# Patient Record
Sex: MALE | Employment: UNEMPLOYED | URBAN - NONMETROPOLITAN AREA
[De-identification: names, ages, dates, MRNs, and addresses within clinical notes are randomized per-mention and may not be internally consistent; named-entity substitution may affect disease eponyms.]

---

## 2024-09-10 ENCOUNTER — OFFICE VISIT (OUTPATIENT)
Dept: URGENT CARE | Facility: PHYSICIAN GROUP | Age: 49
End: 2024-09-10
Payer: MEDICAID

## 2024-09-10 VITALS
DIASTOLIC BLOOD PRESSURE: 80 MMHG | TEMPERATURE: 97.3 F | WEIGHT: 315 LBS | OXYGEN SATURATION: 98 % | SYSTOLIC BLOOD PRESSURE: 128 MMHG | HEART RATE: 110 BPM | RESPIRATION RATE: 16 BRPM | BODY MASS INDEX: 44.1 KG/M2 | HEIGHT: 71 IN

## 2024-09-10 DIAGNOSIS — R00.0 TACHYCARDIA: ICD-10-CM

## 2024-09-10 DIAGNOSIS — H10.31 ACUTE BACTERIAL CONJUNCTIVITIS OF RIGHT EYE: ICD-10-CM

## 2024-09-10 PROCEDURE — 99204 OFFICE O/P NEW MOD 45 MIN: CPT | Performed by: FAMILY MEDICINE

## 2024-09-10 PROCEDURE — 3074F SYST BP LT 130 MM HG: CPT | Performed by: FAMILY MEDICINE

## 2024-09-10 PROCEDURE — 3079F DIAST BP 80-89 MM HG: CPT | Performed by: FAMILY MEDICINE

## 2024-09-10 RX ORDER — CIPROFLOXACIN HYDROCHLORIDE 3.5 MG/ML
1 SOLUTION/ DROPS TOPICAL 2 TIMES DAILY
Qty: 5 ML | Refills: 0 | Status: SHIPPED | OUTPATIENT
Start: 2024-09-10

## 2024-09-10 RX ORDER — LEVETIRACETAM 500 MG/1
500 TABLET ORAL 2 TIMES DAILY
COMMUNITY
End: 2024-09-19

## 2024-09-10 NOTE — PROGRESS NOTES
"  Subjective:      48 y.o. male presents to urgent care for right eye abnormality that started on Friday.  He reports that he left his contacts into long, for approximately 3 months without changing them.  He developed irritation, discharge, and reddish discoloration to his right eye on Friday.  He was able to take both contacts out and they were fully intact.  Unfortunately, he continues to experience his right eye abnormalities.  He last saw an eye doctor approximately 1.5 years ago.    Heart rate is elevated today in urgent care. He denies any chest pain, palpitations, or shortness of breath.    He denies any other questions or concerns at this time.    Current problem list, medication, and past medical/surgical history were reviewed in Epic.    ROS  See HPI     Objective:      /80   Pulse (!) 110   Temp 36.3 °C (97.3 °F) (Temporal)   Resp 16   Ht 1.803 m (5' 11\")   Wt (!) 149 kg (328 lb)   SpO2 98%   BMI 45.75 kg/m²     Physical Exam  Constitutional:       General: He is not in acute distress.     Appearance: He is not diaphoretic.   HENT:      Right Ear: Tympanic membrane, ear canal and external ear normal.      Left Ear: Tympanic membrane, ear canal and external ear normal.      Mouth/Throat:      Tongue: Tongue does not deviate from midline.      Palate: No lesions.      Pharynx: No oropharyngeal exudate or posterior oropharyngeal erythema.      Tonsils: No tonsillar exudate.   Eyes:      General:         Right eye: Discharge present.         Left eye: No discharge.      Extraocular Movements: Extraocular movements intact.      Conjunctiva/sclera:      Right eye: Right conjunctiva is injected.      Left eye: Left conjunctiva is not injected.      Pupils: Pupils are equal, round, and reactive to light.   Cardiovascular:      Rate and Rhythm: Regular rhythm. Tachycardia present.      Heart sounds: Normal heart sounds.   Pulmonary:      Effort: Pulmonary effort is normal. No respiratory distress.    "   Breath sounds: Normal breath sounds.   Neurological:      Mental Status: He is alert.   Psychiatric:         Mood and Affect: Affect normal.         Judgment: Judgment normal.       Assessment/Plan:     1. Acute bacterial conjunctivitis of right eye  2. Tachycardia  Systemic symptoms seen through tachycardia.  Prescription for ciprofloxacin eyedrops has been sent.  He was encouraged to follow with optometry within the next 24 to 48 hours.  - ciprofloxacin (CILOXIN) 0.3 % Solution; Administer 1 Drop into the right eye 2 times a day.  Dispense: 5 mL; Refill: 0          Instructed to return to Urgent Care or nearest Emergency Department if symptoms fail to improve, for any change in condition, further concerns, or new concerning symptoms. Patient states understanding of the plan of care and discharge instructions.    Sarah Piper M.D.

## 2024-09-18 ENCOUNTER — HOSPITAL ENCOUNTER (EMERGENCY)
Facility: MEDICAL CENTER | Age: 49
End: 2024-09-18
Payer: MEDICAID

## 2024-09-19 ENCOUNTER — HOSPITAL ENCOUNTER (EMERGENCY)
Facility: MEDICAL CENTER | Age: 49
End: 2024-09-19
Attending: EMERGENCY MEDICINE
Payer: MEDICAID

## 2024-09-19 VITALS
HEART RATE: 100 BPM | OXYGEN SATURATION: 98 % | RESPIRATION RATE: 18 BRPM | TEMPERATURE: 97.8 F | WEIGHT: 315 LBS | DIASTOLIC BLOOD PRESSURE: 100 MMHG | SYSTOLIC BLOOD PRESSURE: 167 MMHG | HEIGHT: 71 IN | BODY MASS INDEX: 44.1 KG/M2

## 2024-09-19 DIAGNOSIS — B30.0: ICD-10-CM

## 2024-09-19 DIAGNOSIS — H16.001 CORNEAL ULCER OF RIGHT EYE: ICD-10-CM

## 2024-09-19 DIAGNOSIS — R56.9 SEIZURE (HCC): ICD-10-CM

## 2024-09-19 DIAGNOSIS — H54.7 LOSS OF VISION: ICD-10-CM

## 2024-09-19 LAB
GRAM STN SPEC: NORMAL
SIGNIFICANT IND 70042: NORMAL
SITE SITE: NORMAL
SOURCE SOURCE: NORMAL

## 2024-09-19 PROCEDURE — 99284 EMERGENCY DEPT VISIT MOD MDM: CPT

## 2024-09-19 PROCEDURE — 700101 HCHG RX REV CODE 250: Mod: UD | Performed by: EMERGENCY MEDICINE

## 2024-09-19 PROCEDURE — 87070 CULTURE OTHR SPECIMN AEROBIC: CPT

## 2024-09-19 PROCEDURE — 87205 SMEAR GRAM STAIN: CPT

## 2024-09-19 RX ORDER — ERYTHROMYCIN 5 MG/G
1 OINTMENT OPHTHALMIC
Qty: 3.5 G | Refills: 0 | Status: ACTIVE | OUTPATIENT
Start: 2024-09-19 | End: 2024-09-27

## 2024-09-19 RX ORDER — MOXIFLOXACIN 5 MG/ML
1 SOLUTION/ DROPS OPHTHALMIC
Qty: 6 ML | Refills: 0 | Status: ACTIVE | OUTPATIENT
Start: 2024-09-19 | End: 2024-09-26

## 2024-09-19 RX ORDER — PROPARACAINE HYDROCHLORIDE 5 MG/ML
1 SOLUTION/ DROPS OPHTHALMIC ONCE
Status: COMPLETED | OUTPATIENT
Start: 2024-09-19 | End: 2024-09-19

## 2024-09-19 RX ORDER — LEVETIRACETAM 500 MG/1
500 TABLET ORAL 2 TIMES DAILY
Qty: 60 TABLET | Refills: 0 | Status: SHIPPED | OUTPATIENT
Start: 2024-09-19

## 2024-09-19 RX ADMIN — FLUORESCEIN SODIUM 1 MG: 1 STRIP OPHTHALMIC at 14:36

## 2024-09-19 RX ADMIN — PROPARACAINE HYDROCHLORIDE 1 DROP: 5 SOLUTION/ DROPS OPHTHALMIC at 14:36

## 2024-09-19 NOTE — ED TRIAGE NOTES
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    Pt returned to lobby. Educated on triage process and to inform staff of any changes.     BP (!) 144/91   Pulse 98   Temp 35.8 °C (96.5 °F) (Temporal)   Resp 18   Ht 1.803 m (5' 11\")   Wt (!) 149 kg (328 lb 0.7 oz)   SpO2 97%   BMI 45.75 kg/m²     "

## 2024-09-19 NOTE — ED PROVIDER NOTES
"ER Provider Note    Scribed for Ho Vincent M.d. by Mayank Gatica. 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 Hx cl abuse + peroxide. Called [facility] to see if they could fit pt in today. [facility] recommended pt to go to the ER today. Will send notes with pt to go to [place] [facility]. Discussed risk of losing vision permanently out of OD. Pt voiced understanding.\" Patient was also diagnosed with Panuveitis, right eye: \"Anterior and posterior uveitis along with K ulcer. Possible endophthalmitis. Send to ER ASAP.\"    HPI/ROS  LIMITATION TO HISTORY   None noted   OUTSIDE HISTORIAN(S):  None noted     Alexander Prasad is a 48 y.o. male who presents to the ED complaining of right eye pain. Patient arrives to ED with eye patch on his right eye. Patient denies congential blindness, which is incorrectly listed in his medical history. Patient was seen by an optometrist last night who advised him to come to the ED. Patient notes he is compliant with prescribed antibiotics, and takes ciprofloxacin drops once a day for past 5 days. Patient notes he wears a contact lens in the unaffected left eye, does not use contact lenses in his affected eye. Patient says the issue began due to an irritated contact lens. He had previously been splashing peroxide into the affected eye. Patient says he can \"barely see shapes\" out of his affected eye. Patient additionally note she is out of his keppra prescription and would like refill.    PAST MEDICAL HISTORY  History reviewed. No pertinent past " "medical history.    SURGICAL HISTORY  History reviewed. No pertinent surgical history.    FAMILY HISTORY  None noted     SOCIAL HISTORY   reports that he has been smoking cigarettes. He has never used smokeless tobacco. He reports that he does not currently use alcohol. He reports that he does not currently use drugs.    CURRENT MEDICATIONS  Discharge Medication List as of 9/19/2024  3:20 PM        CONTINUE these medications which have NOT CHANGED    Details   ciprofloxacin (CILOXIN) 0.3 % Solution Administer 1 Drop into the right eye 2 times a day., Disp-5 mL, R-0, Normal      levETIRAcetam (KEPPRA) 500 MG Tab Take 500 mg by mouth 2 times a day., Historical Med             ALLERGIES  Patient has no known allergies.    PHYSICAL EXAM  BP (!) 144/91   Pulse 98   Temp 35.8 °C (96.5 °F) (Temporal)   Resp 18   Ht 1.803 m (5' 11\")   Wt (!) 149 kg (328 lb 0.7 oz)   SpO2 97%   BMI 45.75 kg/m²   Constitutional: Alert in no apparent distress.  HENT: No signs of trauma, Bilateral external ears normal, Nose normal. Uvula midline.   Eyes: Pupils are equal and reactive, right sided conjunctival injection.  Ulcer size of pupil approximately half a cm on right cornea. On visual test, patient barely able to visualize the largest 'E' with the affected eye.   Neck: Normal range of motion, No tenderness, Supple, No stridor.   Lymphatic: No lymphadenopathy noted.   Cardiovascular: Regular rate and rhythm, no murmurs.   Thorax & Lungs: No respiratory distress, No wheezing, No chest tenderness.   Skin: Warm, Dry, No erythema, No rash.   Back: No bony tenderness, No CVA tenderness.   Extremities: Intact distal pulses, No edema, No tenderness, No cyanosis.  Neurologic: Alert , Normal motor function, Normal sensory function, No focal deficits noted.   Psychiatric: Affect normal, Judgment normal, Mood normal.      DIAGNOSTIC STUDIES    EKG/LABS    I have independently interpreted this EKG        Radiologist interpretation:  No orders " to display       COURSE & MEDICAL DECISION MAKING     ASSESSMENT, COURSE AND PLAN  Care Narrative:     2:19 PM - Patient is a 48 y.o. male who presents to the ED with right eye pain. Patient seen and examined at bedside. Discussed plan of care, including plan to further evaluate. Patient agrees to the plan of care. The patient will be medicated as ordered. Ordered for labs and imaging to evaluate his symptoms. Call out to ophthalmology. Advised patient of possibility for surgery now.     2:54 PM I discussed the patient's case and the above findings with Dr. Lovett (ophthalmology) who advises we may swab the ye and send him a clinical picture. If infection is progressed enough, we weill admit the patient pending his evaluation.    3:18 PM - Dr Lovett (ophthalmology) will see the patient as an outpatient tomorrow. I discussed plan for discharge and follow up as outlined below. The patient is stable for discharge at this time and will return for any new or worsening symptoms. Patient verbalizes understanding and support with my plan for discharge.      #eye pain  Symptoms consistent with infectious keratitis and uveitis.  EOM intact and no surrounding cellulitis.  Fluorescein exam positive for corneal ulcer.  Negative Clover sign, doubt globe rupture.  No evidence of retained foreign body.     DISPOSITION AND DISCUSSIONS  I have discussed management of the patient with the following physicians and LAMONT's:  Dr Lovett (ophthalmology)    Barriers to care at this time, including but not limited to: Patient does not have established PCP.     The patient will return for new or worsening symptoms and is stable at the time of discharge.    DISPOSITION:  Patient will be discharged home in stable condition.    FOLLOW UP:  [facility], Emergency Dept  [facility] [facility] [address] [phone]    If symptoms worsen    Aston Lovett M.D.  [address] [place] [id number] [phone]      go to now.   If you can make it before 4p, you may be seen.      OUTPATIENT MEDICATIONS:  Discharge Medication List as of 9/19/2024  3:20 PM        START taking these medications    Details   moxifloxacin (VIGAMOX) 0.5 % Solution Administer 1 Drop into the right eye every hour while awake for 7 days., Disp-6 mL, R-0, Normal              FINAL DIANGOSIS  1. Loss of vision    2. Corneal ulcer of right eye    3. Keratoconjunctivitis, infectious    4. Seizure (HCC)          The note accurately reflects work and decisions made by me.  Ho Vincent M.D.  9/19/2024  8:42 PM

## 2024-09-22 LAB
BACTERIA WND AEROBE CULT: NORMAL
GRAM STN SPEC: NORMAL
SIGNIFICANT IND 70042: NORMAL
SITE SITE: NORMAL
SOURCE SOURCE: NORMAL

## 2024-10-01 ENCOUNTER — OFFICE VISIT (OUTPATIENT)
Dept: MEDICAL GROUP | Facility: MEDICAL CENTER | Age: 49
End: 2024-10-01
Attending: FAMILY MEDICINE
Payer: MEDICAID

## 2024-10-01 VITALS
OXYGEN SATURATION: 95 % | WEIGHT: 315 LBS | HEART RATE: 98 BPM | DIASTOLIC BLOOD PRESSURE: 68 MMHG | BODY MASS INDEX: 44.1 KG/M2 | HEIGHT: 71 IN | TEMPERATURE: 98.3 F | SYSTOLIC BLOOD PRESSURE: 118 MMHG

## 2024-10-01 DIAGNOSIS — Z11.4 SCREENING FOR HIV WITHOUT PRESENCE OF RISK FACTORS: ICD-10-CM

## 2024-10-01 DIAGNOSIS — Z13.21 SCREENING FOR ENDOCRINE, NUTRITIONAL, METABOLIC AND IMMUNITY DISORDER: ICD-10-CM

## 2024-10-01 DIAGNOSIS — Z13.0 SCREENING FOR ENDOCRINE, NUTRITIONAL, METABOLIC AND IMMUNITY DISORDER: ICD-10-CM

## 2024-10-01 DIAGNOSIS — Z13.29 SCREENING FOR ENDOCRINE, NUTRITIONAL, METABOLIC AND IMMUNITY DISORDER: ICD-10-CM

## 2024-10-01 DIAGNOSIS — Z11.59 NEED FOR HEPATITIS C SCREENING TEST: ICD-10-CM

## 2024-10-01 DIAGNOSIS — H18.601 KERATOCONUS OF RIGHT EYE: ICD-10-CM

## 2024-10-01 DIAGNOSIS — F10.11 HISTORY OF ALCOHOL ABUSE: ICD-10-CM

## 2024-10-01 DIAGNOSIS — Z13.228 SCREENING FOR ENDOCRINE, NUTRITIONAL, METABOLIC AND IMMUNITY DISORDER: ICD-10-CM

## 2024-10-01 DIAGNOSIS — F15.11 HISTORY OF METHAMPHETAMINE ABUSE (HCC): ICD-10-CM

## 2024-10-01 DIAGNOSIS — Z23 NEED FOR VACCINATION: ICD-10-CM

## 2024-10-01 DIAGNOSIS — H16.001 CORNEAL ULCER OF RIGHT EYE: ICD-10-CM

## 2024-10-01 DIAGNOSIS — Z76.89 ENCOUNTER TO ESTABLISH CARE WITH NEW DOCTOR: ICD-10-CM

## 2024-10-01 DIAGNOSIS — Z85.841 HISTORY OF OLIGODENDROGLIOMA OF BRAIN: ICD-10-CM

## 2024-10-01 DIAGNOSIS — R56.9 SEIZURES (HCC): ICD-10-CM

## 2024-10-01 DIAGNOSIS — Z13.31 POSITIVE SCREENING FOR DEPRESSION ON 9-ITEM PATIENT HEALTH QUESTIONNAIRE (PHQ-9): ICD-10-CM

## 2024-10-01 PROBLEM — C71.9 OLIGODENDROGLIOMA (HCC): Status: ACTIVE | Noted: 2020-06-05

## 2024-10-01 PROBLEM — D49.6 BRAIN TUMOR (HCC): Status: ACTIVE | Noted: 2020-05-14

## 2024-10-01 PROCEDURE — 90471 IMMUNIZATION ADMIN: CPT

## 2024-10-01 PROCEDURE — 99214 OFFICE O/P EST MOD 30 MIN: CPT | Performed by: FAMILY MEDICINE

## 2024-10-01 RX ORDER — MOXIFLOXACIN HCL 0.5 %
1 DROPS OPHTHALMIC (EYE)
COMMUNITY
Start: 2024-09-26

## 2024-10-01 RX ORDER — LEVETIRACETAM 500 MG/1
500 TABLET ORAL 2 TIMES DAILY
Qty: 60 TABLET | Refills: 2 | Status: SHIPPED | OUTPATIENT
Start: 2024-10-01

## 2024-10-01 ASSESSMENT — PATIENT HEALTH QUESTIONNAIRE - PHQ9
5. POOR APPETITE OR OVEREATING: 3 - NEARLY EVERY DAY
SUM OF ALL RESPONSES TO PHQ QUESTIONS 1-9: 15
CLINICAL INTERPRETATION OF PHQ2 SCORE: 6

## 2024-10-03 ENCOUNTER — APPOINTMENT (OUTPATIENT)
Dept: HEMATOLOGY ONCOLOGY | Facility: MEDICAL CENTER | Age: 49
End: 2024-10-03
Payer: MEDICAID

## 2024-10-10 ENCOUNTER — APPOINTMENT (OUTPATIENT)
Dept: HEMATOLOGY ONCOLOGY | Facility: MEDICAL CENTER | Age: 49
End: 2024-10-10
Payer: MEDICAID

## 2024-10-17 ENCOUNTER — HOSPITAL ENCOUNTER (OUTPATIENT)
Dept: HEMATOLOGY ONCOLOGY | Facility: MEDICAL CENTER | Age: 49
End: 2024-10-17
Attending: PSYCHIATRY & NEUROLOGY
Payer: MEDICAID

## 2024-10-17 VITALS
BODY MASS INDEX: 44.1 KG/M2 | OXYGEN SATURATION: 97 % | HEART RATE: 87 BPM | WEIGHT: 315 LBS | HEIGHT: 71 IN | TEMPERATURE: 97 F

## 2024-10-17 DIAGNOSIS — R56.9 SEIZURES (HCC): ICD-10-CM

## 2024-10-17 DIAGNOSIS — C71.9 OLIGODENDROGLIOMA (HCC): ICD-10-CM

## 2024-10-17 DIAGNOSIS — D49.6 BRAIN TUMOR (HCC): ICD-10-CM

## 2024-10-17 PROCEDURE — 99205 OFFICE O/P NEW HI 60 MIN: CPT | Performed by: PSYCHIATRY & NEUROLOGY

## 2024-10-17 PROCEDURE — 99212 OFFICE O/P EST SF 10 MIN: CPT | Performed by: PSYCHIATRY & NEUROLOGY

## 2024-11-14 ENCOUNTER — APPOINTMENT (OUTPATIENT)
Dept: RADIOLOGY | Facility: MEDICAL CENTER | Age: 49
End: 2024-11-14
Attending: PSYCHIATRY & NEUROLOGY
Payer: MEDICAID

## 2024-11-21 ENCOUNTER — APPOINTMENT (OUTPATIENT)
Dept: HEMATOLOGY ONCOLOGY | Facility: MEDICAL CENTER | Age: 49
End: 2024-11-21
Payer: MEDICAID

## 2024-11-21 ENCOUNTER — TELEPHONE (OUTPATIENT)
Dept: HEMATOLOGY ONCOLOGY | Facility: MEDICAL CENTER | Age: 49
End: 2024-11-21
Payer: MEDICAID

## 2024-11-21 NOTE — TELEPHONE ENCOUNTER
Attempted to call pt to confirm that he needed to be seen in office, per Dr. Hunt he is not on any treatment at this time and his MRI is scheduled until 1/2025. Provider would prefer to see him after scan, will attempt to call pt at a later time due to VM not being set up

## 2025-01-03 DIAGNOSIS — R56.9 SEIZURES (HCC): ICD-10-CM

## 2025-01-07 RX ORDER — LEVETIRACETAM 500 MG/1
500 TABLET ORAL 2 TIMES DAILY
Qty: 180 TABLET | Refills: 2 | Status: SHIPPED | OUTPATIENT
Start: 2025-01-07

## 2025-01-08 NOTE — TELEPHONE ENCOUNTER
Received request via: Pharmacy    Was the patient seen in the last year in this department? Yes    Does the patient have an active prescription (recently filled or refills available) for medication(s) requested? No    Pharmacy Name: [facility]    Does the patient have correction Plus and need 100-day supply? (This applies to ALL medications) Patient does not have SCP

## 2025-01-09 ENCOUNTER — HOSPITAL ENCOUNTER (OUTPATIENT)
Dept: RADIOLOGY | Facility: MEDICAL CENTER | Age: 50
End: 2025-01-09
Attending: PSYCHIATRY & NEUROLOGY
Payer: MEDICAID

## 2025-01-09 ENCOUNTER — TELEPHONE (OUTPATIENT)
Dept: HEMATOLOGY ONCOLOGY | Facility: MEDICAL CENTER | Age: 50
End: 2025-01-09
Payer: MEDICAID

## 2025-01-09 DIAGNOSIS — R56.9 SEIZURES (HCC): ICD-10-CM

## 2025-01-09 DIAGNOSIS — C71.9 OLIGODENDROGLIOMA (HCC): ICD-10-CM

## 2025-01-09 DIAGNOSIS — D49.6 BRAIN TUMOR (HCC): ICD-10-CM

## 2025-01-09 PROCEDURE — A9579 GAD-BASE MR CONTRAST NOS,1ML: HCPCS | Mod: JZ,UD

## 2025-01-09 PROCEDURE — 700117 HCHG RX CONTRAST REV CODE 255: Mod: JZ,UD

## 2025-01-09 PROCEDURE — 70553 MRI BRAIN STEM W/O & W/DYE: CPT

## 2025-01-09 RX ADMIN — GADOTERIDOL 20 ML: 279.3 INJECTION, SOLUTION INTRAVENOUS at 12:37

## 2025-01-15 ENCOUNTER — APPOINTMENT (OUTPATIENT)
Dept: HEMATOLOGY ONCOLOGY | Facility: MEDICAL CENTER | Age: 50
End: 2025-01-15
Payer: MEDICAID

## 2025-01-23 ENCOUNTER — HOSPITAL ENCOUNTER (OUTPATIENT)
Dept: HEMATOLOGY ONCOLOGY | Facility: MEDICAL CENTER | Age: 50
End: 2025-01-23
Attending: PSYCHIATRY & NEUROLOGY
Payer: MEDICAID

## 2025-01-23 VITALS
SYSTOLIC BLOOD PRESSURE: 100 MMHG | OXYGEN SATURATION: 97 % | WEIGHT: 315 LBS | BODY MASS INDEX: 44.1 KG/M2 | DIASTOLIC BLOOD PRESSURE: 80 MMHG | HEART RATE: 93 BPM | TEMPERATURE: 97.5 F | HEIGHT: 71 IN

## 2025-01-23 DIAGNOSIS — D49.6 BRAIN TUMOR (HCC): ICD-10-CM

## 2025-01-23 DIAGNOSIS — R56.9 SEIZURES (HCC): ICD-10-CM

## 2025-01-23 PROCEDURE — 99212 OFFICE O/P EST SF 10 MIN: CPT | Performed by: PSYCHIATRY & NEUROLOGY

## 2025-01-23 PROCEDURE — 99215 OFFICE O/P EST HI 40 MIN: CPT | Performed by: PSYCHIATRY & NEUROLOGY

## 2025-01-23 NOTE — PROGRESS NOTES
"[facility] NEURO-ONCOLOGY CLINIC    Date of service: 01/23/25    Last office encounter: 10/17/24     Chief complain  Grade 2 oligodendroglioma    Oncology history  5/22/2020: Presented with seizures, underwent GTR of a right frontal mass. Path consistent with IDH-mutant, grade 2 oligodendroglioma (Dr. Robertson). No chemo or radiation after that.     2024: Moved to [place] from [place] to take care of his grandmother.     Follow up history  Seizure 3 weeks ago, having them maybe once every 2 months. Most of the times he is sleeping but his aunt mentions that \"he was going crazy\". He felt confused but went back to normal after 5 minutes. There was no incontinence or tongue lacerations. He has been taking the Keppra every day without missing a dose. He did not go to the hospital.    Review of Systems (ROS)  Negative for: Fever, chills, chest pain, shortness of breath, cough, diarrhea, constipation, urinary frequency, dysuria, skin rash, swelling.      Medications  Outpatient Medications Marked as Taking for the 1/23/25 encounter (Hospital Encounter) with Juan Hunt M.D.   Medication Sig Dispense Refill    levETIRAcetam (KEPPRA) 500 MG Tab Take 1 tablet by mouth twice daily 180 Tablet 2         Physical exam    Vitals:    01/23/25 1352   BP: 100/80   Pulse: 93   Temp: 36.4 °C (97.5 °F)   SpO2: 97%   Weight: (!) 149 kg (328 lb)   Height: 1.803 m (5' 10.98\")         Focused Neurological Exam:    Alert and oriented to all spheres.  Speech fluency and comprehension are intact.  There are no cranial neuropathies.  No dysmetria, ataxia, or tremors.  Muscle tone, bulk, and strength are normal and symmetric.  Normal sensation to light touch throughout.  Reflexes are 2+ in the patellar tendon, bilaterally.  The gait posture and movements are normal, without any form of support.     KPS: 90      Laboratory results  None      Imaging results        MRI brain: 1/9/25  1.  Expansile nonenhancing T2 hyperintense mass in the " parasagittal right frontal lobe measuring approximately 6.8 x 2.6 x 2.9 cm status post biopsy or partial excision.  2.  Probably small right parafalcine extra-axial postoperative fluid collection.  3.  No acute infarct or hemorrhage.        Pathology/Molecular studies  5/21/2020  FINAL MICROSCOPIC PATHOLOGIC DIAGNOSIS   This case was sent to [email or web address] for interpretation.  Dr. 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 male with a right frontal, grade 2 oligodendroglioma s/p cranitomy in 2020 ([place]). Neurologically, it appears that he continues to have seizures during his sleep despite taking Keppra without missing a dose. His neurological exam is non-focal. I reviewed the most recent MRI brain with him. There is right frontal tumor with non-enhancing components. I'm not sure if this is represents residual tumor vs recurrence of disease, as apparently he got a GTR in the past. I plan to repeat another MRI in 2 months and try to push the images from outside into our system. In the meantime, I would like him to get labs and an EEG, and I will see him with the repeat scan in 2 months.    1. Seizures (HCC)  -EEG  -C/w Keppra.  - Labs: CBC, CMP, Keppra levels    2. Brain tumor (HCC)  - Obtain images and surgical report from outside.  - MRI brain in 2 months and RTC    Numerous questions were answered to the best of my knowledge. The patient is in agreement with the plan.   Return to the clinic 2 months.      ADMINISTRATIVE BILLING  I personally spent a total of 40 minutes for this encounter.    Juan Hunt MD  Diplomate of the American Board of Psychiatry and Neurology.  General Neurology & Neuro-Oncology.  [facility].   of Clinical Neurology at [facility]  Medicine.

## 2025-01-24 NOTE — ADDENDUM NOTE
Encounter addended by: Lyndsay Mayorga, Med Ass't on: 1/24/2025 8:27 AM   Actions taken: Charge Capture section accepted

## 2025-01-28 ENCOUNTER — HOSPITAL ENCOUNTER (OUTPATIENT)
Dept: LAB | Facility: MEDICAL CENTER | Age: 50
End: 2025-01-28
Attending: PSYCHIATRY & NEUROLOGY
Payer: MEDICAID

## 2025-01-28 ENCOUNTER — HOSPITAL ENCOUNTER (OUTPATIENT)
Dept: LAB | Facility: MEDICAL CENTER | Age: 50
End: 2025-01-28
Attending: FAMILY MEDICINE
Payer: MEDICAID

## 2025-01-28 DIAGNOSIS — Z11.59 NEED FOR HEPATITIS C SCREENING TEST: ICD-10-CM

## 2025-01-28 DIAGNOSIS — Z85.841 HISTORY OF OLIGODENDROGLIOMA OF BRAIN: ICD-10-CM

## 2025-01-28 DIAGNOSIS — R56.9 SEIZURES (HCC): ICD-10-CM

## 2025-01-28 DIAGNOSIS — Z13.29 SCREENING FOR ENDOCRINE, NUTRITIONAL, METABOLIC AND IMMUNITY DISORDER: ICD-10-CM

## 2025-01-28 DIAGNOSIS — Z13.21 SCREENING FOR ENDOCRINE, NUTRITIONAL, METABOLIC AND IMMUNITY DISORDER: ICD-10-CM

## 2025-01-28 DIAGNOSIS — Z11.4 SCREENING FOR HIV WITHOUT PRESENCE OF RISK FACTORS: ICD-10-CM

## 2025-01-28 DIAGNOSIS — Z13.0 SCREENING FOR ENDOCRINE, NUTRITIONAL, METABOLIC AND IMMUNITY DISORDER: ICD-10-CM

## 2025-01-28 DIAGNOSIS — Z13.228 SCREENING FOR ENDOCRINE, NUTRITIONAL, METABOLIC AND IMMUNITY DISORDER: ICD-10-CM

## 2025-01-28 LAB
ALBUMIN SERPL BCP-MCNC: 4.5 G/DL (ref 3.2–4.9)
ALBUMIN SERPL BCP-MCNC: 4.5 G/DL (ref 3.2–4.9)
ALBUMIN/GLOB SERPL: 1.5 G/DL
ALBUMIN/GLOB SERPL: 1.5 G/DL
ALP SERPL-CCNC: 85 U/L (ref 30–99)
ALP SERPL-CCNC: 86 U/L (ref 30–99)
ALT SERPL-CCNC: 14 U/L (ref 2–50)
ALT SERPL-CCNC: 16 U/L (ref 2–50)
ANION GAP SERPL CALC-SCNC: 12 MMOL/L (ref 7–16)
ANION GAP SERPL CALC-SCNC: 13 MMOL/L (ref 7–16)
AST SERPL-CCNC: 15 U/L (ref 12–45)
AST SERPL-CCNC: 16 U/L (ref 12–45)
BASOPHILS # BLD AUTO: 0.7 % (ref 0–1.8)
BASOPHILS # BLD AUTO: 0.9 % (ref 0–1.8)
BASOPHILS # BLD: 0.07 K/UL (ref 0–0.12)
BASOPHILS # BLD: 0.09 K/UL (ref 0–0.12)
BILIRUB SERPL-MCNC: 0.2 MG/DL (ref 0.1–1.5)
BILIRUB SERPL-MCNC: 0.2 MG/DL (ref 0.1–1.5)
BUN SERPL-MCNC: 13 MG/DL (ref 8–22)
BUN SERPL-MCNC: 13 MG/DL (ref 8–22)
CALCIUM ALBUM COR SERPL-MCNC: 8.7 MG/DL (ref 8.5–10.5)
CALCIUM ALBUM COR SERPL-MCNC: 8.7 MG/DL (ref 8.5–10.5)
CALCIUM SERPL-MCNC: 9.1 MG/DL (ref 8.5–10.5)
CALCIUM SERPL-MCNC: 9.1 MG/DL (ref 8.5–10.5)
CHLORIDE SERPL-SCNC: 102 MMOL/L (ref 96–112)
CHLORIDE SERPL-SCNC: 103 MMOL/L (ref 96–112)
CHOLEST SERPL-MCNC: 194 MG/DL (ref 100–199)
CO2 SERPL-SCNC: 23 MMOL/L (ref 20–33)
CO2 SERPL-SCNC: 23 MMOL/L (ref 20–33)
CREAT SERPL-MCNC: 0.92 MG/DL (ref 0.5–1.4)
CREAT SERPL-MCNC: 1.12 MG/DL (ref 0.5–1.4)
EOSINOPHIL # BLD AUTO: 0.14 K/UL (ref 0–0.51)
EOSINOPHIL # BLD AUTO: 0.17 K/UL (ref 0–0.51)
EOSINOPHIL NFR BLD: 1.4 % (ref 0–6.9)
EOSINOPHIL NFR BLD: 1.6 % (ref 0–6.9)
ERYTHROCYTE [DISTWIDTH] IN BLOOD BY AUTOMATED COUNT: 39.9 FL (ref 35.9–50)
ERYTHROCYTE [DISTWIDTH] IN BLOOD BY AUTOMATED COUNT: 40.2 FL (ref 35.9–50)
EST. AVERAGE GLUCOSE BLD GHB EST-MCNC: 111 MG/DL
FASTING STATUS PATIENT QL REPORTED: NORMAL
FASTING STATUS PATIENT QL REPORTED: NORMAL
GFR SERPLBLD CREATININE-BSD FMLA CKD-EPI: 103 ML/MIN/1.73 M 2
GFR SERPLBLD CREATININE-BSD FMLA CKD-EPI: 81 ML/MIN/1.73 M 2
GLOBULIN SER CALC-MCNC: 3 G/DL (ref 1.9–3.5)
GLOBULIN SER CALC-MCNC: 3.1 G/DL (ref 1.9–3.5)
GLUCOSE SERPL-MCNC: 98 MG/DL (ref 65–99)
GLUCOSE SERPL-MCNC: 98 MG/DL (ref 65–99)
HBA1C MFR BLD: 5.5 % (ref 4–5.6)
HCT VFR BLD AUTO: 47.5 % (ref 42–52)
HCT VFR BLD AUTO: 48.8 % (ref 42–52)
HCV AB SER QL: NORMAL
HDLC SERPL-MCNC: 34 MG/DL
HGB BLD-MCNC: 16.1 G/DL (ref 14–18)
HGB BLD-MCNC: 16.3 G/DL (ref 14–18)
HIV 1+2 AB+HIV1 P24 AG SERPL QL IA: NORMAL
IMM GRANULOCYTES # BLD AUTO: 0.12 K/UL (ref 0–0.11)
IMM GRANULOCYTES # BLD AUTO: 0.14 K/UL (ref 0–0.11)
IMM GRANULOCYTES NFR BLD AUTO: 1.2 % (ref 0–0.9)
IMM GRANULOCYTES NFR BLD AUTO: 1.3 % (ref 0–0.9)
LDLC SERPL CALC-MCNC: ABNORMAL MG/DL
LYMPHOCYTES # BLD AUTO: 3.09 K/UL (ref 1–4.8)
LYMPHOCYTES # BLD AUTO: 3.25 K/UL (ref 1–4.8)
LYMPHOCYTES NFR BLD: 30.8 % (ref 22–41)
LYMPHOCYTES NFR BLD: 31.3 % (ref 22–41)
MCH RBC QN AUTO: 28.3 PG (ref 27–33)
MCH RBC QN AUTO: 28.4 PG (ref 27–33)
MCHC RBC AUTO-ENTMCNC: 33.4 G/DL (ref 32.3–36.5)
MCHC RBC AUTO-ENTMCNC: 33.9 G/DL (ref 32.3–36.5)
MCV RBC AUTO: 83.6 FL (ref 81.4–97.8)
MCV RBC AUTO: 85 FL (ref 81.4–97.8)
MONOCYTES # BLD AUTO: 0.6 K/UL (ref 0–0.85)
MONOCYTES # BLD AUTO: 0.66 K/UL (ref 0–0.85)
MONOCYTES NFR BLD AUTO: 6.1 % (ref 0–13.4)
MONOCYTES NFR BLD AUTO: 6.3 % (ref 0–13.4)
NEUTROPHILS # BLD AUTO: 5.85 K/UL (ref 1.82–7.42)
NEUTROPHILS # BLD AUTO: 6.24 K/UL (ref 1.82–7.42)
NEUTROPHILS NFR BLD: 59.1 % (ref 44–72)
NEUTROPHILS NFR BLD: 59.3 % (ref 44–72)
NRBC # BLD AUTO: 0 K/UL
NRBC # BLD AUTO: 0 K/UL
NRBC BLD-RTO: 0 /100 WBC (ref 0–0.2)
NRBC BLD-RTO: 0 /100 WBC (ref 0–0.2)
PLATELET # BLD AUTO: 260 K/UL (ref 164–446)
PLATELET # BLD AUTO: 266 K/UL (ref 164–446)
PMV BLD AUTO: 8.5 FL (ref 9–12.9)
PMV BLD AUTO: 8.6 FL (ref 9–12.9)
POTASSIUM SERPL-SCNC: 3.9 MMOL/L (ref 3.6–5.5)
POTASSIUM SERPL-SCNC: 3.9 MMOL/L (ref 3.6–5.5)
PROT SERPL-MCNC: 7.5 G/DL (ref 6–8.2)
PROT SERPL-MCNC: 7.6 G/DL (ref 6–8.2)
RBC # BLD AUTO: 5.68 M/UL (ref 4.7–6.1)
RBC # BLD AUTO: 5.74 M/UL (ref 4.7–6.1)
SODIUM SERPL-SCNC: 137 MMOL/L (ref 135–145)
SODIUM SERPL-SCNC: 139 MMOL/L (ref 135–145)
TRIGL SERPL-MCNC: 485 MG/DL (ref 0–149)
WBC # BLD AUTO: 10.6 K/UL (ref 4.8–10.8)
WBC # BLD AUTO: 9.9 K/UL (ref 4.8–10.8)

## 2025-01-28 PROCEDURE — 85025 COMPLETE CBC W/AUTO DIFF WBC: CPT | Mod: 91

## 2025-01-28 PROCEDURE — 36415 COLL VENOUS BLD VENIPUNCTURE: CPT

## 2025-01-28 PROCEDURE — 83036 HEMOGLOBIN GLYCOSYLATED A1C: CPT

## 2025-01-28 PROCEDURE — 80053 COMPREHEN METABOLIC PANEL: CPT | Mod: 91

## 2025-01-28 PROCEDURE — 80061 LIPID PANEL: CPT

## 2025-01-28 PROCEDURE — 80053 COMPREHEN METABOLIC PANEL: CPT

## 2025-01-28 PROCEDURE — 87389 HIV-1 AG W/HIV-1&-2 AB AG IA: CPT

## 2025-01-28 PROCEDURE — 80177 DRUG SCRN QUAN LEVETIRACETAM: CPT

## 2025-01-28 PROCEDURE — 85025 COMPLETE CBC W/AUTO DIFF WBC: CPT

## 2025-01-28 PROCEDURE — 86803 HEPATITIS C AB TEST: CPT

## 2025-01-29 ENCOUNTER — PATIENT MESSAGE (OUTPATIENT)
Dept: HEALTH INFORMATION MANAGEMENT | Facility: OTHER | Age: 50
End: 2025-01-29

## 2025-01-29 DIAGNOSIS — E78.1 HIGH TRIGLYCERIDES: ICD-10-CM

## 2025-01-30 LAB — LEVETIRACETAM SERPL-MCNC: 13 UG/ML (ref 10–40)

## 2025-04-24 ENCOUNTER — HOSPITAL ENCOUNTER (OUTPATIENT)
Dept: RADIOLOGY | Facility: MEDICAL CENTER | Age: 50
End: 2025-04-24
Attending: PSYCHIATRY & NEUROLOGY
Payer: MEDICAID

## 2025-04-24 DIAGNOSIS — R56.9 SEIZURES (HCC): ICD-10-CM

## 2025-04-24 DIAGNOSIS — D49.6 BRAIN TUMOR (HCC): ICD-10-CM

## 2025-04-24 PROCEDURE — 70553 MRI BRAIN STEM W/O & W/DYE: CPT

## 2025-04-24 PROCEDURE — A9579 GAD-BASE MR CONTRAST NOS,1ML: HCPCS | Mod: JZ,UD | Performed by: PSYCHIATRY & NEUROLOGY

## 2025-04-24 PROCEDURE — 700117 HCHG RX CONTRAST REV CODE 255: Mod: JZ,UD | Performed by: PSYCHIATRY & NEUROLOGY

## 2025-04-24 RX ADMIN — GADOTERIDOL 20 ML: 279.3 INJECTION, SOLUTION INTRAVENOUS at 18:49

## 2025-05-01 ENCOUNTER — HOSPITAL ENCOUNTER (OUTPATIENT)
Dept: HEMATOLOGY ONCOLOGY | Facility: MEDICAL CENTER | Age: 50
End: 2025-05-01
Attending: PSYCHIATRY & NEUROLOGY
Payer: MEDICAID

## 2025-05-01 VITALS
OXYGEN SATURATION: 95 % | HEIGHT: 71 IN | TEMPERATURE: 97.3 F | BODY MASS INDEX: 44.1 KG/M2 | HEART RATE: 86 BPM | WEIGHT: 315 LBS | DIASTOLIC BLOOD PRESSURE: 78 MMHG | SYSTOLIC BLOOD PRESSURE: 138 MMHG

## 2025-05-01 DIAGNOSIS — C71.9 OLIGODENDROGLIOMA (HCC): ICD-10-CM

## 2025-05-01 PROCEDURE — 99212 OFFICE O/P EST SF 10 MIN: CPT | Performed by: PSYCHIATRY & NEUROLOGY

## 2025-05-01 PROCEDURE — 99215 OFFICE O/P EST HI 40 MIN: CPT | Performed by: PSYCHIATRY & NEUROLOGY

## 2025-05-01 ASSESSMENT — FIBROSIS 4 INDEX: FIB4 SCORE: 0.68

## 2025-05-01 NOTE — PROGRESS NOTES
"[facility] NEURO-ONCOLOGY CLINIC    Date of service: 5/1/25    Last office encounter: 1/23/25    Chief complain  Grade 2 oligodendroglioma    Oncology history  5/22/2020: Presented with seizures, underwent GTR of a right frontal mass. Path consistent with IDH-mutant, grade 2 oligodendroglioma (Dr. Robertson). No chemo or radiation after that.   2024: Moved to [place] from [place] to take care of his grandmother.   10/17/24: Established care with me.   1/9/25: MRI brain showed a right 6.8 x 2.6 x 2.9 cm right parasagittal mass.  4/24/25: MRI brain, 5.7 x 3.9 x 2.6 mass. Unchanged lesion with new focal enhancement in the anterior portion.     Follow up history  No seizures. Sporadic headaches, probably once a week. No weakness or falls. No other issues. He continues to take the Keppra. No mood changes.     Review of Systems (ROS)  Negative for: Fever, chills, chest pain, shortness of breath, cough, diarrhea, constipation, urinary frequency, dysuria, skin rash, swelling.      Medications  Outpatient Medications Marked as Taking for the 5/1/25 encounter (Hospital Encounter) with Juan Hunt M.D.   Medication Sig Dispense Refill    levETIRAcetam (KEPPRA) 500 MG Tab Take 1 tablet by mouth twice daily 180 Tablet 2         Physical exam    Vitals:    05/01/25 1333   BP: 138/78   Pulse: 86   Temp: 36.3 °C (97.3 °F)   TempSrc: Temporal   SpO2: 95%   Weight: (!) 149 kg (329 lb)   Height: 1.803 m (5' 10.98\")           Focused Neurological Exam:    Alert and oriented to all spheres.  Speech fluency and comprehension are intact.  There are no cranial neuropathies.  No dysmetria, ataxia, or tremors.  Muscle tone, bulk, and strength are normal and symmetric.  Normal sensation to light touch throughout.  Reflexes are 2+ in the patellar tendon, bilaterally.  The gait posture and movements are normal, without any form of support.     KPS: 90      Laboratory results  None      Imaging results        MRI brain: 4/24/25  There is an " approximately 57 x 39 x 26 mm sized T1 hypointense T2 hyperintense lesion in the right frontal lobe. The gradient echo images demonstrates few punctate areas of hyperintensities likely representing calcification/hemorrhage. The perfusion   weighted images does not demonstrate any increased perfusion. The post gadolinium sequences demonstrates focal mild contrast enhancement in the anterior portion of the lesion. When compared with the previous MRI, the size of the lesion is unchanged.   However the focal enhancement in the anterior portion of the lesion is new. Therefore close follow-up study is recommended.        Pathology/Molecular studies  5/21/2020  FINAL MICROSCOPIC PATHOLOGIC DIAGNOSIS   This case was sent to [facility] for interpretation.  Dr. 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 male with a right frontal oligodendroglioma s/p craniotomy and a presumed to be grade 2 and GTR in 2020 ([place]). I have not been able to obtain the records from this institution, and his most recent scan shows new contrast enhancement in a 6 cm right frontal mass. Again, I'm not sure what extent of surgery he had or if this is indeed a grade 2 oligodengroglioma. What is clear is that he is having sporadic seizures, headaches, and there are changes in the MRI of the brain. For this reason, I will present him at tumor board next week and obtain a repeat scan in 1 month. I plan to see him there after in [facility] We will again try to obtain records from [place]. He will call with questions or concerns in the interim.     1. Seizures (HCC)  - EEG (to be scheduled)  -C/w Keppra.    2. Brain tumor (HCC)  - Obtain images and surgical report from outside.  - MRI brain in 1 month and RTC ([facility] after that.    Numerous questions were answered to the  best of my knowledge. The patient is in agreement with the plan.   Return to the clinic 1 month.      ADMINISTRATIVE BILLING  I personally spent a total of 40 minutes for this encounter.    Juan Hunt MD  Diplomate of the American Board of Psychiatry and Neurology.  General Neurology & Neuro-Oncology.  [facility].   of Clinical Neurology at [facility].

## 2025-05-01 NOTE — ADDENDUM NOTE
Encounter addended by: Korinne Mitchell, Med Ass't on: 5/1/2025 3:28 PM   Actions taken: Charge Capture section accepted

## 2025-05-07 ENCOUNTER — TELEPHONE (OUTPATIENT)
Dept: HEMATOLOGY ONCOLOGY | Facility: MEDICAL CENTER | Age: 50
End: 2025-05-07
Payer: MEDICAID

## 2025-05-07 ENCOUNTER — PATIENT MESSAGE (OUTPATIENT)
Dept: HEMATOLOGY ONCOLOGY | Facility: MEDICAL CENTER | Age: 50
End: 2025-05-07
Payer: MEDICAID

## 2025-05-07 ENCOUNTER — PATIENT OUTREACH (OUTPATIENT)
Dept: ONCOLOGY | Facility: MEDICAL CENTER | Age: 50
End: 2025-05-07
Payer: MEDICAID

## 2025-05-07 NOTE — PROGRESS NOTES
"Referral received, neuro MDC team working on getting a hold of pt to be seen next week if possible.  Call placed to patient for navigation and to assess any barriers to care, no answer and \"voice mail not set up\".  Call placed to Mary Ann, emergency contact, permission in Epic to speak with, left voice message requesting return call and that office is trying to reach to schedule him.  "

## 2025-05-12 ENCOUNTER — PATIENT OUTREACH (OUTPATIENT)
Dept: ONCOLOGY | Facility: MEDICAL CENTER | Age: 50
End: 2025-05-12
Payer: MEDICAID

## 2025-05-12 NOTE — PROGRESS NOTES
2nd attempt to contact patient regarding provider wanting him to be seen in neuro MDC sooner than scheduled appointment on 6/2.  No answer and voice mailbox not set up.  Call placed to emergency contact, romeo Reese message.

## 2025-05-23 ENCOUNTER — NON-PROVIDER VISIT (OUTPATIENT)
Dept: NEUROLOGY | Facility: MEDICAL CENTER | Age: 50
End: 2025-05-23
Attending: PSYCHIATRY & NEUROLOGY
Payer: MEDICAID

## 2025-05-23 DIAGNOSIS — R56.9 SEIZURES (HCC): ICD-10-CM

## 2025-05-23 DIAGNOSIS — D49.6 BRAIN TUMOR (HCC): Primary | ICD-10-CM

## 2025-05-23 PROCEDURE — 95819 EEG AWAKE AND ASLEEP: CPT | Performed by: PSYCHIATRY & NEUROLOGY

## 2025-05-23 PROCEDURE — 95819 EEG AWAKE AND ASLEEP: CPT | Mod: 26 | Performed by: PSYCHIATRY & NEUROLOGY

## 2025-05-23 PROCEDURE — 99999 PR NO CHARGE: CPT | Performed by: PSYCHIATRY & NEUROLOGY

## 2025-05-23 NOTE — PROCEDURES
[facility]    Outpatient Standard Video Electroencephalogram Report      Patient Name: Alexander Prasad  MRN: [id number]  Date of Service: 05/23/25  Total Recording Time: 0 hours and 25 minutes.  Referring Provider: Juan Hunt M.D.    INDICATION:  Alexander Prasad 49 y.o. male. This standard, outpatient, EEG was requested to evaluate for seizure(s).    CURRENT ANTI-SEIZURE AND OTHER PERTINENT MEDICATIONS:     Current Outpatient Medications:     levETIRAcetam, 500 mg, Oral, BID    Vigamox, Administer 1 Drop into the right eye. (Patient not taking: Reported on 1/23/2025)    ciprofloxacin, 1 Drop, Right Eye, BID (Patient not taking: Reported on 1/23/2025)    TECHNIQUE: Standard outpatient video EEG was set up by a Neurodiagnostic technologist who performed education to the patient and staff. A minimum of 23 electrodes and 23 channel recording was setup and performed by Neurodiagnostic technologist, in accordance with the international 10-20 system. Impedence, electrode integrity, and technical impressions were documented. The study was reviewed in bipolar and referential montages. The recording examined the patient in the awake, drowsy, and sleep state(s).     DESCRIPTION OF THE RECORD:  EEG background: During maximal wakefulness, the background was continuous, asymmetrical, and 11.5 Hz posterior dominant rhythm.  Reactivity and state changes were present.  During drowsiness, a loss of myogenic artifact and theta/delta frequencies were seen.     Occasional N2 sleep transients in the form of rudimentary and/or ill-defined sleep spindles fragments and vertex waves were seen in the leads over the central regions.     ACTIVATION PROCEDURES:   Intermittent Photic stimulation was performed in a stepwise fashion from 1 to 30 Hz and did not elicit additional abnormalities on EEG.     ICTAL AND INTERICTAL FINDINGS:   No focal or generalized epileptiform activity noted.     There was intermittent, discrete, right frontal,  focal slowing.     No electroclinical or electrographic seizures were reported or recorded during the study.     EKG: Sampling of the EKG recording did not demonstrate any abnormalities    EVENTS:  No clinical events recorded or reported    INTERPRETATION:  This was an abnormal video EEG recording in the awake, drowsy, and sleep state(s):  The presence of intermittent, discrete, right frontal, focal slowing, points toward cerebral dysfunction in that area. This finding might be seen in context of structural lesion and/or ictal onset zone, among other considerations. Clinical and radiological correlation is recommended.   Epileptiform discharges: No definitive epileptiform discharges or other epileptiform phenomena seen.   No seizures.     As always, an absence of seizures/epileptiform discharges does not exclude the diagnosis of seizures/epilepsy. If clinical suspicion persists, a prolonged EEG monitoring might be considered.     Sylvester Coughlin MD  Neurology Attending, Epilepsy Program  [facility]

## 2025-05-29 ENCOUNTER — APPOINTMENT (OUTPATIENT)
Dept: RADIOLOGY | Facility: MEDICAL CENTER | Age: 50
End: 2025-05-29
Attending: PSYCHIATRY & NEUROLOGY
Payer: MEDICAID

## 2025-06-02 ENCOUNTER — HOSPITAL ENCOUNTER (OUTPATIENT)
Dept: RADIATION ONCOLOGY | Facility: MEDICAL CENTER | Age: 50
End: 2025-06-02
Attending: RADIOLOGY
Payer: MEDICAID

## 2025-06-02 ENCOUNTER — HOSPITAL ENCOUNTER (OUTPATIENT)
Dept: HEMATOLOGY ONCOLOGY | Facility: MEDICAL CENTER | Age: 50
End: 2025-06-02
Attending: PSYCHIATRY & NEUROLOGY
Payer: MEDICAID

## 2025-06-02 VITALS
RESPIRATION RATE: 18 BRPM | OXYGEN SATURATION: 95 % | HEIGHT: 71 IN | TEMPERATURE: 97.3 F | SYSTOLIC BLOOD PRESSURE: 151 MMHG | DIASTOLIC BLOOD PRESSURE: 50 MMHG | BODY MASS INDEX: 44.1 KG/M2 | WEIGHT: 315 LBS | HEART RATE: 91 BPM

## 2025-06-02 VITALS
OXYGEN SATURATION: 95 % | DIASTOLIC BLOOD PRESSURE: 105 MMHG | RESPIRATION RATE: 18 BRPM | TEMPERATURE: 97.3 F | SYSTOLIC BLOOD PRESSURE: 151 MMHG | BODY MASS INDEX: 46.05 KG/M2 | WEIGHT: 315 LBS | HEART RATE: 91 BPM

## 2025-06-02 DIAGNOSIS — C71.9 OLIGODENDROGLIOMA (HCC): Primary | ICD-10-CM

## 2025-06-02 DIAGNOSIS — R56.9 SEIZURES (HCC): ICD-10-CM

## 2025-06-02 PROCEDURE — 99215 OFFICE O/P EST HI 40 MIN: CPT | Performed by: PSYCHIATRY & NEUROLOGY

## 2025-06-02 PROCEDURE — 99214 OFFICE O/P EST MOD 30 MIN: CPT | Performed by: RADIOLOGY

## 2025-06-02 PROCEDURE — 99212 OFFICE O/P EST SF 10 MIN: CPT | Performed by: PSYCHIATRY & NEUROLOGY

## 2025-06-02 PROCEDURE — 99204 OFFICE O/P NEW MOD 45 MIN: CPT | Performed by: RADIOLOGY

## 2025-06-02 ASSESSMENT — LIFESTYLE VARIABLES
SMOKING_YEARS: 20
TOBACCO_USE: YES
SMOKING_STATUS: YES

## 2025-06-02 ASSESSMENT — FIBROSIS 4 INDEX
FIB4 SCORE: 0.68
FIB4 SCORE: 0.68

## 2025-06-02 ASSESSMENT — PAIN SCALES - GENERAL: PAINLEVEL_OUTOF10: NO PAIN

## 2025-06-02 NOTE — PROGRESS NOTES
Multidisciplinary Brain Tumor Clinic  Neurosurgery Clinic Note      Patient: Alexander Prasad MRN: [id number]    Date of Consultation: 6/2/2025    Reason for Consultation: Brain tumor    Referring Physician: Dr. Sam MD radiation oncology    Chief Complaint: Seizure    History of Present Illness:  The patient is a 49 y.o. male presenting with a right frontal brain lesion.  He has a history of right frontal craniotomy for resection of a frontal lobe mass in May 2020 in [place].  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  He did not undergo any post operative chemotherapy or radiation and was ultimately lost to follow-up.  He relocated to [facility].  Unfortunately he had a seizure, and was discovered to have suspected recurrence of his tumor.  He is presenting today in multidisciplinary clinic to discuss next steps.  He currently does not have any headaches.  He does take Keppra.    Medications:  Current Medications[1]    Allergies:  Allergies[2]    Past Medical History:  Past Medical History[3]    Past Surgical History:  Past Surgical History[4]    Family History:  Family History   Problem Relation Age of Onset    Cancer Mother         oligodendrioma    Hypertension Father     Heart Disease Father         Age 27    Cancer Maternal Grandmother         ?lung    Heart Disease Paternal Grandmother     No Known Problems Son        Social History:  Social History     Socioeconomic History    Marital status: Single     Spouse name: Not on file    Number of children: 1    Years of education: Not on file    Highest education level: Some college, no degree   Occupational History    Not on file   Tobacco Use    Smoking status: Every Day     Current packs/day: 0.50     Average packs/day: 0.5 packs/day for 20.4 years (10.2 ttl pk-yrs)     Types: Cigarettes     Start date: 2005    Smokeless tobacco: Never    Tobacco comments:     5 cig/day; started smoking age 17.    Vaping  Use    Vaping status: Never Used   Substance and Sexual Activity    Alcohol use: Not Currently     Comment: h/o heavy drinking for a few years; sober since     Drug use: Not Currently     Types: Methamphetamines     Comment: Meth abuse x 6 years; abstinent since     Sexual activity: Not Currently     Comment: Single   Other Topics Concern    Not on file   Social History Narrative    Lives with grandmother in [place]; [place]. Also lives with maternal aunt who has 4 dogs. Son lives in [place]. Not currently working.      Social Drivers of Health     Financial Resource Strain: Not on file   Food Insecurity: Not on file   Transportation Needs: Not on file   Physical Activity: Not on file   Stress: Not on file   Social Connections: Not on file   Intimate Partner Violence: Not on file   Housing Stability: Medium Risk (12/10/2023)    Received from [facility]     Housing Stability: Not on file     Housing Problems: Not on file       Physical Examination:  Vitals:    25 0907   BP: (!) 151/105   Pulse: 91   Resp: 18   Temp: 36.3 °C (97.3 °F)   SpO2: 95%     Poor dentition  Obese  Eye Openin Eyes open spontaneously Motor Response: 6 Follows commands Verbal Response: 5 Oriented to person, place, and time Total: 15  Awake, alert, oriented to person, place and time.  Pupils equally round and reactive to light, 4 to 3mm.  Extraocular movements full.  Facial sensation intact to light touch.  Face symmetric, HB 1.  Palate rises symmetrically.  Tongue is midline.  Shoulder shrug 5/5.  No pronator drift.  Patient moves all 4 extremities with full strength equally.  Sensation intact to light touch in all 4 extremities.    Bicoronal incision noted      Labs:                    Imaging:  MRI brain with and without contrast, with perfusion dated 2025 was independently reviewed in detail,  and my interpretation is as follows. Compared with 25. There is  an approximately 57 x 39 x 26 mm sized T1 hypointense T2 hyperintense lesion in the right frontal lobe. The gradient echo images demonstrates few punctate areas of hyperintensities likely representing calcification/hemorrhage. The perfusion   weighted images does not demonstrate any increased perfusion. The post gadolinium sequences demonstrates focal mild contrast enhancement in the anterior portion of the lesion. When compared with the previous MRI, the size of the lesion is unchanged.   However the focal enhancement in the anterior portion of the lesion is new. Therefore close follow-up study is recommended.    Assessment and Plan:    Alexander Prasad is a 49 y.o. male presenting with history of WHO 2 oligodendroglioma, IDH wt, 1p19q codeleted presenting with suspected recurrence.  I had a long conversation with Alexander about his neuroimaging findings, his symptoms, his recommended plan of care.  I recommended operative resection for maximal safe/gross total resection.  A large margin around the tumor will be excised to reduce the likelihood of recurrence, though the possibility of future recurrence remains. The patient has a history of seizures related to the brain tumor and is currently on anti-seizure medication.  - Plan:    - Schedule surgery within the next 1-6 weeks    - Perform tumor resection with wide margins    - Informed consent obtained:      - Risks discussed: bleeding, infection, increased seizures, no change in seizures      - Small risk of SMA syndrome (temporary left arm and face weakness, speech difficulties)      - Potential need for short-term rehabilitation    - Perioperative care:      - Overnight stay in intensive care unit post-surgery      - Transfer to regular floor if stable      - Initiate physical therapy for mobilization    - Continue anti-seizure medication indefinitely    - Obtain special pre-operative MRI    - Order pre-operative tests: x-ray, EKG, labs    - Schedule follow-up  appointment to discuss pathology results    - Discussed further treatment options based on tumor grade with Dr. Hunt and Dr. Vargas:      - Grade 2: observation or medication      - Grade 3 or 4: consider chemotherapy or radiation    - Recommended smoking cessation    Follow-up:  - Schedule follow-up appointment to discuss pathology results    Recommendations:    Thank you for this consult. Please call with questions.    Henna Johnson M.D.  [facility]  [address] [place] [address]  [phone]    A total of 45 minutes were spent in the evaluation, examination, coordination of care, review of labs and imaging of this patient. I spent >50% of time face-to-face on patient counseling.          [1]   Current Outpatient Medications   Medication Sig Dispense Refill    levETIRAcetam (KEPPRA) 500 MG Tab Take 1 tablet by mouth twice daily 180 Tablet 2    VIGAMOX 0.5 % Solution Administer 1 Drop into the right eye. (Patient not taking: Reported on 1/23/2025)      ciprofloxacin (CILOXIN) 0.3 % Solution Administer 1 Drop into the right eye 2 times a day. (Patient not taking: Reported on 1/23/2025) 5 mL 0     No current facility-administered medications for this encounter.   [2] No Known Allergies  [3]   Past Medical History:  Diagnosis Date    Imprisonment and other incarceration 05/18/2020   [4]   Past Surgical History:  Procedure Laterality Date    CRANIOTOMY TUMOR  2020    Performed in [facility]

## 2025-06-02 NOTE — PROGRESS NOTES
"[facility] NEURO-ONCOLOGY CLINIC ([facility])    Date of service: 6/2/25    Last office encounter: 5/1/25    Chief complain  Grade 2 oligodendroglioma    History of present illness (HPI): 10/17/24  Alexander Prasad is a 48 y.o. male with a left frontal, grade 2 - 3, 1p/19q co-deleted oligodendroglioma s/p GTR on 5/22/2020 (Dr. Lucho Morrow, [place] [facility], [place], [place]). He first presented with GTC seizures and was put on Keppra. The initial MRI showed faint contrast enhancement at the tumor bed, but he did not have any chemo or radiation because he lost his insurance. He did not follow up with anyone since then, and moved to [place] 4 months ago to take care of his grandmother. He established care with a PCP, who referred him to my office.      He comes to the clinic unaccompanied. He still gets these seizures maybe once every 3 months, in the context of forgetting to take his medication. He has no other neurological symptoms to report. He has baseline shortness of breath and was recently in the ER for an ulcer in his right eye.    Follow up: 1/23/25  Seizure 3 weeks ago, having them maybe once every 2 months. Most of the times he is sleeping but his aunt mentions that \"he was going crazy\". He felt confused but went back to normal after 5 minutes. There was no incontinence or tongue lacerations. He has been taking the Keppra every day without missing a dose. He did not go to the hospital.    Follow up: 5/1/25  No seizures. Sporadic headaches, probably once a week. No weakness or falls. No other issues. He continues to take the Keppra. No mood changes.     Follow up: 6/2/25  Alexander was seen at the [facility] today in the presence of Dr. Vargas and Dr. Johnson. He was unaccompanied.   He has not had any more seizures since the last time I saw him. Denies headaches. No new issues.     Review of Systems (ROS)  Negative for: Fever, chills, chest pain, shortness of breath, cough, diarrhea, constipation, urinary frequency, " "dysuria, skin rash, swelling.      Oncology history  5/22/2020: Presented with seizures, underwent GTR of a right frontal mass. Path consistent with IDH-mutant, grade 2 oligodendroglioma (Dr. Robertson). No chemo or radiation after that.   2024: Moved to [place] from [place] to take care of his grandmother.   10/17/24: Established care with me.   1/9/25: MRI brain showed a right 6.8 x 2.6 x 2.9 cm right parasagittal mass.  4/24/25: MRI brain, 5.7 x 3.9 x 2.6 mass. Unchanged lesion with new focal enhancement in the anterior portion.           Medications  Medications Ordered Prior to Encounter[1]      Physical exam    Vitals:    06/02/25 0950   BP: (!) 151/50   Pulse: 91   Resp: 18   Temp: 36.3 °C (97.3 °F)   TempSrc: Temporal   SpO2: 95%   Weight: (!) 150 kg (330 lb 11 oz)   Height: 1.803 m (5' 10.98\")             Focused Neurological Exam:    Alert and oriented to all spheres.  Speech fluency and comprehension are intact.  There are no cranial neuropathies.  No dysmetria, ataxia, or tremors.  Muscle tone, bulk, and strength are normal and symmetric.  Normal sensation to light touch throughout.  Reflexes are 2+ in the patellar tendon, bilaterally.  The gait posture and movements are normal, without any form of support.     KPS: 90      Laboratory results  None      Imaging results: No new imaging to review.         MRI brain: 4/24/25  There is an approximately 57 x 39 x 26 mm sized T1 hypointense T2 hyperintense lesion in the right frontal lobe. The gradient echo images demonstrates few punctate areas of hyperintensities likely representing calcification/hemorrhage. The perfusion   weighted images does not demonstrate any increased perfusion. The post gadolinium sequences demonstrates focal mild contrast enhancement in the anterior portion of the lesion. When compared with the previous MRI, the size of the lesion is unchanged.   However the focal enhancement in the anterior portion of the lesion is new. Therefore close " follow-up study is recommended.    EE25  This was an abnormal video EEG recording in the awake, drowsy, and sleep state(s):  The presence of intermittent, discrete, right frontal, focal slowing, points toward cerebral dysfunction in that area. This finding might be seen in context of structural lesion and/or ictal onset zone, among other considerations. Clinical and radiological correlation is recommended.   Epileptiform discharges: No definitive epileptiform discharges or other epileptiform phenomena seen.   No seizures.         Pathology/Molecular studies  2020  FINAL MICROSCOPIC PATHOLOGIC DIAGNOSIS   This case was sent to [facility] for interpretation.  Dr. 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 male with a right frontal oligodendroglioma s/p craniotomy and a presumed to be grade 2 and GTR in  ([place]). I have not been able to obtain the records from this institution, and his most recent scan shows new contrast enhancement in a 6 cm right frontal mass. Again, I'm not sure what extent of surgery he had or if this is indeed a grade 2 oligodengroglioma. What is clear is that he is having sporadic seizures, headaches, and there are changes in the MRI of the brain. We presented his case at tumor board and the consensus was to redo a frontal craniotomy with aims for GTR.   The procedure was explained in detail by Dr. Johnson, and he agreed to proceed. He will likely get scheduled within the next 4-6 weeks. We will see him back in [facility] once we obtain the finalized pathology results.     1. Seizures (HCC)  Likely related to his right frontal tumor.    - The EEG was reviewed and did not capture definitive seizure activity. He has not had more episodes in the past month.   -C/w Keppra.    2. Brain tumor  (HCC)  Grade 2 oligodendroglioma per outside pathology, s/p resection in 2020 without adjuvant treatments.   Now causing seizures.     - The patient agreed to proceed with redo craniotomy for removal of the right frontal tumor. The procedure and potential complications were explained in detail.     Numerous questions were answered to the best of my knowledge. The patient is in agreement with the plan.   Return to the clinic ([facility]) after surgery in approximately 6-8 weeks.      ADMINISTRATIVE BILLING  I personally spent a total of 40 minutes for this encounter.    Juan Hunt MD  Diplomate of the American Board of Psychiatry and Neurology.  General Neurology & Neuro-Oncology.  [facility].   of Clinical Neurology at [facility].         [1]   Current Outpatient Medications on File Prior to Encounter   Medication Sig Dispense Refill    levETIRAcetam (KEPPRA) 500 MG Tab Take 1 tablet by mouth twice daily 180 Tablet 2    VIGAMOX 0.5 % Solution Administer 1 Drop into the right eye. (Patient not taking: Reported on 1/23/2025)      ciprofloxacin (CILOXIN) 0.3 % Solution Administer 1 Drop into the right eye 2 times a day. (Patient not taking: Reported on 1/23/2025) 5 mL 0     No current facility-administered medications on file prior to encounter.

## 2025-06-02 NOTE — PROGRESS NOTES
Patient was seen today in clinic with Dr. Vargas for consult.  Vitals signs and weight were obtained and pain assessment was completed.  Allergies and medications were reviewed with the patient.       Vitals/Pain:  Vitals:    06/02/25 0907   BP: (!) 151/105   BP Location: Right arm   Patient Position: Sitting   BP Cuff Size: Adult   Pulse: 91   Resp: 18   Temp: 36.3 °C (97.3 °F)   TempSrc: Temporal   SpO2: 95%   Weight: (!) 150 kg (330 lb 0.5 oz)   Pain Score: No pain        Allergies:   Patient has no known allergies.    Current Medications:  Current Medications[1]      PCP:  Katey Ramos R.N.         [1]   Current Outpatient Medications   Medication Sig Dispense Refill    levETIRAcetam (KEPPRA) 500 MG Tab Take 1 tablet by mouth twice daily 180 Tablet 2    VIGAMOX 0.5 % Solution Administer 1 Drop into the right eye. (Patient not taking: Reported on 1/23/2025)      ciprofloxacin (CILOXIN) 0.3 % Solution Administer 1 Drop into the right eye 2 times a day. (Patient not taking: Reported on 1/23/2025) 5 mL 0     No current facility-administered medications for this encounter.

## 2025-06-02 NOTE — CONSULTS
RADIATION ONCOLOGY CONSULT    Patient name:  Alexnader Prasad    Primary Physician:  Marylin Del Toro M.D. MRN: [id number]  [facility]: [id number]   Referring physician:  Juan Hunt M.D.  : 1975, 49 y.o.     DATE OF SERVICE: 2025    IDENTIFICATION: A 49 y.o. male with   Oligodendroglioma (HCC)  Staging form: Brain and Spinal Cord, AJCC Version 9  - Clinical stage from 2020: WHO G2 - Signed by Yarely Vargas M.D. on 2025  Histopathologic type: Oligodendroglioma, NOS  Stage prefix: Initial diagnosis  Histologic grading system: 4 grade system  1p loss of heterozygosity (LAURENT): Positive  IDH mutation: Positive  19q loss of heterozygosity (LAURENT): Positive        He is here at the kind request of Juan Sorto M.D.        HISTORY OF PRESENT ILLNESS:  Subjective     This is a 49-year-old gentleman who comes today to discuss treatment options were for what appears to be a recurrent glioma.  His history dates back to  when he initially presented with seizures, and underwent reportedly a gross total resection at that time of right frontal lobe mass.  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  Grade 2 oligodendroglioma, 1P 19Q code deleted.  Apparently he had no adjuvant therapy with systemic therapy or radiation.  Initial surgery and treatment was done in [place].    Subsequently, he moved to [place] and establish care with neuro-oncology in .  He had an initial MRI done here in January that showed a 6.8 cm mass around the right parasagittal frontal lobe.  There is no enhancement in the mass itself, and at the time this was her first baseline MRI here.  Minimal edema.    He then had a follow-up MRI on  that confirmed now a 5.7 cm mass, largely unchanged, but with new areas of focal enhancement in the anterior portion of the lesion.  No uptake on perfusion.    Given these findings, he now comes to discuss next steps in our neuro-oncology multidisciplinary clinic.  He did have an EEG  done that was largely negative for any epileptiform activity.  He continues on Keppra.  However there is some clinical concern that he is having seizures currently.        PROBLEM LIST:  Problem List[1]     PAST SURGICAL HISTORY:  Past Surgical History[2]    CURRENT MEDICATIONS:  Current Medications[3]    ALLERGIES:    Patient has no known allergies.    FAMILY HISTORY:    family history includes Cancer in his maternal grandmother and mother; Heart Disease in his father and paternal grandmother; Hypertension in his father; No Known Problems in his son.    SOCIAL HISTORY:     reports that he has been smoking cigarettes. He started smoking about 20 years ago. He has a 10.2 pack-year smoking history. He has never used smokeless tobacco. He reports that he does not currently use alcohol. He reports that he does not currently use drugs after having used the following drugs: Methamphetamines. Pt lives in [place] with family. Not currently working.     REVIEW OF SYSTEMS:    A complete review of systems taken. Pertinent items in HPI. All others negative.    PHYSICAL EXAM:    PERFORMANCE STATUS:      6/2/2025     9:13 AM   ECOG Performance Review   ECOG Performance Status Fully active, able to carry on all pre-disease performance without restriction         6/2/2025     9:13 AM   Karnofsky Score   Karnofsky Score 100     BP (!) 151/105 (BP Location: Right arm, Patient Position: Sitting, BP Cuff Size: Adult)   Pulse 91   Temp 36.3 °C (97.3 °F) (Temporal)   Resp 18   Wt (!) 150 kg (330 lb 0.5 oz)   SpO2 95%   BMI 46.05 kg/m²   Physical Exam  Constitutional:       Appearance: Normal appearance.   HENT:      Head: Normocephalic and atraumatic.   Eyes:      Extraocular Movements: Extraocular movements intact.      Conjunctiva/sclera: Conjunctivae normal.   Cardiovascular:      Rate and Rhythm: Normal rate and regular rhythm.   Pulmonary:      Effort: Pulmonary effort is normal. No respiratory distress.   Musculoskeletal:          General: Normal range of motion.   Neurological:      General: No focal deficit present.      Mental Status: He is alert and oriented to person, place, and time.      Cranial Nerves: No cranial nerve deficit.      Sensory: No sensory deficit.      Motor: No weakness.      Gait: Gait normal.          LABORATORY DATA:   Lab Results   Component Value Date/Time    WBC 9.9 01/28/2025 03:05 PM    WBC 10.6 01/28/2025 03:05 PM    RBC 5.68 01/28/2025 03:05 PM    RBC 5.74 01/28/2025 03:05 PM    HEMOGLOBIN 16.1 01/28/2025 03:05 PM    HEMOGLOBIN 16.3 01/28/2025 03:05 PM    HEMATOCRIT 47.5 01/28/2025 03:05 PM    HEMATOCRIT 48.8 01/28/2025 03:05 PM    MCV 83.6 01/28/2025 03:05 PM    MCV 85.0 01/28/2025 03:05 PM    MCH 28.3 01/28/2025 03:05 PM    MCH 28.4 01/28/2025 03:05 PM    MCHC 33.9 01/28/2025 03:05 PM    MCHC 33.4 01/28/2025 03:05 PM    RDW 40.2 01/28/2025 03:05 PM    RDW 39.9 01/28/2025 03:05 PM    PLATELETCT 260 01/28/2025 03:05 PM    PLATELETCT 266 01/28/2025 03:05 PM    MPV 8.5 (L) 01/28/2025 03:05 PM    MPV 8.6 (L) 01/28/2025 03:05 PM    NEUTSPOLYS 59.30 01/28/2025 03:05 PM    NEUTSPOLYS 59.10 01/28/2025 03:05 PM    LYMPHOCYTES 31.30 01/28/2025 03:05 PM    LYMPHOCYTES 30.80 01/28/2025 03:05 PM    MONOCYTES 6.10 01/28/2025 03:05 PM    MONOCYTES 6.30 01/28/2025 03:05 PM    EOSINOPHILS 1.40 01/28/2025 03:05 PM    EOSINOPHILS 1.60 01/28/2025 03:05 PM    BASOPHILS 0.70 01/28/2025 03:05 PM    BASOPHILS 0.90 01/28/2025 03:05 PM      Lab Results   Component Value Date/Time    SODIUM 137 01/28/2025 03:05 PM    SODIUM 139 01/28/2025 03:05 PM    POTASSIUM 3.9 01/28/2025 03:05 PM    POTASSIUM 3.9 01/28/2025 03:05 PM    CHLORIDE 102 01/28/2025 03:05 PM    CHLORIDE 103 01/28/2025 03:05 PM    CO2 23 01/28/2025 03:05 PM    CO2 23 01/28/2025 03:05 PM    GLUCOSE 98 01/28/2025 03:05 PM    GLUCOSE 98 01/28/2025 03:05 PM    BUN 13 01/28/2025 03:05 PM    BUN 13 01/28/2025 03:05 PM    CREATININE 1.12 01/28/2025 03:05 PM    CREATININE  0.92 01/28/2025 03:05 PM           RADIOLOGY DATA:  MR-BRAIN-WITH & W/O  Result Date: 4/28/2025  IMPRESSION : There is an approximately 57 x 39 x 26 mm sized T1 hypointense T2 hyperintense lesion in the right frontal lobe. The gradient echo images demonstrates few punctate areas of hyperintensities likely representing calcification/hemorrhage. The perfusion weighted images does not demonstrate any increased perfusion. The post gadolinium sequences demonstrates focal mild contrast enhancement in the anterior portion of the lesion. When compared with the previous MRI, the size of the lesion is unchanged. However the focal enhancement in the anterior portion of the lesion is new. Therefore close follow-up study is recommended.      IMPRESSION:    A 49 y.o. with  Oligodendroglioma (HCC)  Staging form: Brain and Spinal Cord, AJCC Version 9  - Clinical stage from 5/21/2020: WHO G2 - Signed by Yarely Vargas M.D. on 6/2/2025  Histopathologic type: Oligodendroglioma, NOS  Stage prefix: Initial diagnosis  Histologic grading system: 4 grade system  1p loss of heterozygosity (LAURENT): Positive  IDH mutation: Positive  19q loss of heterozygosity (LAURENT): Positive        RECOMMENDATIONS:   We met Alexander today in our neuro-oncology multidisciplinary clinic with myself, neurosurgery and neuro-oncology.  Our consensus is that given the presence of the lesion itself, and the location, and his history, this appears to be relatively amenable to a repeat resection.  Furthermore, given the evolution of the punctate areas of enhancement as well as some clinical concern that he is having recurrent seizures, our consensus is to proceed with a repeat resection and to attempt a gross total resection.  Depending on the final pathology, if he has recurrent tumor, then treatment options could include continued observation if this is a gross total resection of a low-grade glioma, possible IDH mutation directed therapy if indicated, or alternatively  if he has a higher grade lesion, then possibly chemotherapy and radiation.    Will discuss the appropriate next steps with surgical planning, and he reviewed surgical details with neurosurgery.  We will plan to see him back in our neuro multidisciplinary clinic after surgery is completed to review pathology and then discuss appropriate next steps in his care.    Thank you for the opportunity to participate in his care.  If any questions or comments, please do not hesitate in calling.    Orders Placed This Encounter    Referral to Oncology Psychosocial Screening for Distress                [1]   Patient Active Problem List  Diagnosis    Oligodendroglioma (HCC)    Brain tumor (HCC)    History of alcohol abuse    History of methamphetamine abuse (HCC)    Seizures (HCC)   [2]   Past Surgical History:  Procedure Laterality Date    CRANIOTOMY TUMOR  2020    Performed in [facility]   [3]   Current Outpatient Medications   Medication Sig Dispense Refill    levETIRAcetam (KEPPRA) 500 MG Tab Take 1 tablet by mouth twice daily 180 Tablet 2    VIGAMOX 0.5 % Solution Administer 1 Drop into the right eye. (Patient not taking: Reported on 1/23/2025)      ciprofloxacin (CILOXIN) 0.3 % Solution Administer 1 Drop into the right eye 2 times a day. (Patient not taking: Reported on 1/23/2025) 5 mL 0     No current facility-administered medications for this encounter.

## 2025-06-02 NOTE — ADDENDUM NOTE
Encounter addended by: Henna Johnson M.D. on: 6/2/2025 10:20 AM   Actions taken: Clinical Note Signed

## 2025-06-19 ENCOUNTER — PATIENT OUTREACH (OUTPATIENT)
Dept: ONCOLOGY | Facility: MEDICAL CENTER | Age: 50
End: 2025-06-19
Payer: MEDICAID

## 2025-06-19 NOTE — PROGRESS NOTES
Call placed to patient for nurse navigation, no answer unable to leave voice message as no mailbox has been set up.  Message and letter sent to patient via [employer].

## 2025-06-19 NOTE — PROGRESS NOTES
Pt returned [employer] message.  He had questions regarding the surgery and was hoping to get dates so he could schedule his SSI appointment.  He reported was given information he will need another MRI before as well and waiting to hear from neurosurgery office.  Reached out to [facility] neurosurgery and left message with Dr Johnson's, MA to see if could get an update.  Responded back to patient's message with information.

## 2025-06-23 ENCOUNTER — PATIENT OUTREACH (OUTPATIENT)
Dept: ONCOLOGY | Facility: MEDICAL CENTER | Age: 50
End: 2025-06-23
Payer: MEDICAID

## 2025-06-24 ENCOUNTER — APPOINTMENT (OUTPATIENT)
Dept: ADMISSIONS | Facility: MEDICAL CENTER | Age: 50
End: 2025-06-24
Attending: NEUROLOGICAL SURGERY
Payer: MEDICAID

## 2025-06-24 ENCOUNTER — PATIENT OUTREACH (OUTPATIENT)
Dept: ONCOLOGY | Facility: MEDICAL CENTER | Age: 50
End: 2025-06-24
Payer: MEDICAID

## 2025-06-24 NOTE — PROGRESS NOTES
"Follow up call placed to patient to verify [facility] had reached out to him.  Pt said they had this morning.  They are working on getting MRI that was scheduled for tomorrow am changed, he reported MTM needs 3 days advanced notice.  Reviewed with him that he should be able to schedule \"urgent\" trip with them without needing 3 day window.  Pt said he had asked in past, but they declined.  Let him know navigator can assist with this if needs transportation for sooner appointment timeline.  He will update navigator when he hears back from [facility] about if they were able to move MRI.  "

## 2025-06-24 NOTE — PROGRESS NOTES
"On June 23rd, 2025, Oncology Social Worker Jazmin Child contacted pt. via telephone to follow up on psychosocial distress screening.  OSW Mateusz introduced herself, role and reason for call.  Pt. shared he was getting SSI.  Pt. shared he had been going through the disability process for the past 2 years.  Pt. shared his only concern is waiting for his MRI to be scheduled and for his surgery to be scheduled.  OSFER Child spoke to pt. about other possible stressors.  Pt. stated \"I think I'm good.  If you can have my navigator help with the MRI scheduling that would be great.\"  LIGIA Child encouraged pt. to reach out to her in the future if he needs additional support and/or resources.    "

## 2025-06-25 ENCOUNTER — APPOINTMENT (OUTPATIENT)
Dept: RADIOLOGY | Facility: MEDICAL CENTER | Age: 50
End: 2025-06-25
Attending: NEUROLOGICAL SURGERY
Payer: MEDICAID

## 2025-06-26 ENCOUNTER — PATIENT OUTREACH (OUTPATIENT)
Dept: ONCOLOGY | Facility: MEDICAL CENTER | Age: 50
End: 2025-06-26
Payer: MEDICAID

## 2025-06-26 NOTE — PROGRESS NOTES
"Pt sent [employer] message asking if navigator can call to set up transportation for him:     \"Declan Benavides you said you could call the transit place for me if so I need rides there and back .I'll be at [address] [place] [address] my phone number is [phone]  1975 dates of appts. 25 at [address] 11;45am,25 at [address] 5:30am ,25 at [address] 11:30 am pickup address [email or web address]\"    Call placed to patient letting him know that happy to call with him to set up rides.  Reviewed MTM would need additional information from him about when he will need return ride and if things changed navigator would not have that information.  It is best if he directly calls or navigator can call with him.  Apologized for any misunderstanding that navigator would be able to do this for him.  He stated makes since and will see what he can do.  Encouraged him to follow back up with navigator if any concerns or issues with this.  "

## 2025-06-27 ENCOUNTER — PRE-ADMISSION TESTING (OUTPATIENT)
Dept: ADMISSIONS | Facility: MEDICAL CENTER | Age: 50
DRG: 026 | End: 2025-06-27
Attending: NEUROLOGICAL SURGERY
Payer: MEDICAID

## 2025-06-27 NOTE — OR NURSING
Preadmit: Telephone preadmit completed with patient scheduled for procedure on 07/08/25 with Dr. Johnson. Pre-procedure instructions, fasting guidelines, check in location, and medication instructions reviewed with patient. Patient aware to hold any vitamins, supplements, and aspirin for 7 days prior to procedure and all NSAIDS including aleve and ibuprofen for 5 days prior to surgery. Patient verbalized understanding of all instructions. Copy of medication instructions available in [email or web address] in AVS summary. Patient lives in [place] and is unable to come to [place] for pre-op testing, plans to go to [facility] tomorrow morning. Telephone call to Lee, surgery scheduler for Dr. Johnson. Message left to make him aware patient will go to [facility] tomorrow morning and to verify if orders have already been sent for same.  Did not receive a response from Lee as yet. Will check for lab results Monday and follow up as needed.   No

## 2025-06-27 NOTE — PREADMIT AVS NOTE
Current Medications   Medication Instructions    levETIRAcetam (KEPPRA) 500 MG Tab Continue taking medication as prescribed, including morning of procedure

## 2025-06-30 NOTE — OR NURSING
Left message with Lee  for Dr. Johnson, regarding patient not coming in for testing prior to surgery.

## 2025-06-30 NOTE — OR NURSING
Spoke with Lee  from Dr. Johnson's office, he stated patient was to go to [facility]. Lee will follow up with patient.

## 2025-07-01 ENCOUNTER — HOSPITAL ENCOUNTER (OUTPATIENT)
Dept: LAB | Facility: MEDICAL CENTER | Age: 50
End: 2025-07-01
Attending: NEUROLOGICAL SURGERY
Payer: MEDICAID

## 2025-07-01 LAB
ABO GROUP BLD: NORMAL
ANION GAP SERPL CALC-SCNC: 13 MMOL/L (ref 7–16)
APTT PPP: 31.2 SEC (ref 24.7–36)
BASOPHILS # BLD AUTO: 1 % (ref 0–1.8)
BASOPHILS # BLD: 0.1 K/UL (ref 0–0.12)
BLD GP AB SCN SERPL QL: NORMAL
BUN SERPL-MCNC: 14 MG/DL (ref 8–22)
CALCIUM SERPL-MCNC: 8.8 MG/DL (ref 8.5–10.5)
CHLORIDE SERPL-SCNC: 106 MMOL/L (ref 96–112)
CO2 SERPL-SCNC: 19 MMOL/L (ref 20–33)
CREAT SERPL-MCNC: 1.08 MG/DL (ref 0.5–1.4)
EOSINOPHIL # BLD AUTO: 0.35 K/UL (ref 0–0.51)
EOSINOPHIL NFR BLD: 3.6 % (ref 0–6.9)
ERYTHROCYTE [DISTWIDTH] IN BLOOD BY AUTOMATED COUNT: 40.1 FL (ref 35.9–50)
GFR SERPLBLD CREATININE-BSD FMLA CKD-EPI: 84 ML/MIN/1.73 M 2
GLUCOSE SERPL-MCNC: 97 MG/DL (ref 65–99)
HCT VFR BLD AUTO: 44.1 % (ref 42–52)
HGB BLD-MCNC: 14.8 G/DL (ref 14–18)
IMM GRANULOCYTES # BLD AUTO: 0.11 K/UL (ref 0–0.11)
IMM GRANULOCYTES NFR BLD AUTO: 1.1 % (ref 0–0.9)
INR PPP: 0.99 (ref 0.87–1.13)
LYMPHOCYTES # BLD AUTO: 2.72 K/UL (ref 1–4.8)
LYMPHOCYTES NFR BLD: 27.8 % (ref 22–41)
MCH RBC QN AUTO: 28.5 PG (ref 27–33)
MCHC RBC AUTO-ENTMCNC: 33.6 G/DL (ref 32.3–36.5)
MCV RBC AUTO: 85 FL (ref 81.4–97.8)
MONOCYTES # BLD AUTO: 0.55 K/UL (ref 0–0.85)
MONOCYTES NFR BLD AUTO: 5.6 % (ref 0–13.4)
NEUTROPHILS # BLD AUTO: 5.94 K/UL (ref 1.82–7.42)
NEUTROPHILS NFR BLD: 60.9 % (ref 44–72)
NRBC # BLD AUTO: 0 K/UL
NRBC BLD-RTO: 0 /100 WBC (ref 0–0.2)
PLATELET # BLD AUTO: 293 K/UL (ref 164–446)
PMV BLD AUTO: 8.5 FL (ref 9–12.9)
POTASSIUM SERPL-SCNC: 4.1 MMOL/L (ref 3.6–5.5)
PROTHROMBIN TIME: 13.1 SEC (ref 12–14.6)
RBC # BLD AUTO: 5.19 M/UL (ref 4.7–6.1)
RH BLD: NORMAL
SODIUM SERPL-SCNC: 138 MMOL/L (ref 135–145)
WBC # BLD AUTO: 9.8 K/UL (ref 4.8–10.8)

## 2025-07-01 PROCEDURE — 80048 BASIC METABOLIC PNL TOTAL CA: CPT

## 2025-07-01 PROCEDURE — 85730 THROMBOPLASTIN TIME PARTIAL: CPT

## 2025-07-01 PROCEDURE — 86900 BLOOD TYPING SEROLOGIC ABO: CPT

## 2025-07-01 PROCEDURE — 86901 BLOOD TYPING SEROLOGIC RH(D): CPT

## 2025-07-01 PROCEDURE — 85025 COMPLETE CBC W/AUTO DIFF WBC: CPT

## 2025-07-01 PROCEDURE — 36415 COLL VENOUS BLD VENIPUNCTURE: CPT

## 2025-07-01 PROCEDURE — 86850 RBC ANTIBODY SCREEN: CPT

## 2025-07-01 PROCEDURE — 85610 PROTHROMBIN TIME: CPT

## 2025-07-05 ENCOUNTER — HOSPITAL ENCOUNTER (OUTPATIENT)
Dept: RADIOLOGY | Facility: MEDICAL CENTER | Age: 50
End: 2025-07-05
Attending: NEUROLOGICAL SURGERY
Payer: MEDICAID

## 2025-07-05 DIAGNOSIS — Z01.812 PRE-OPERATIVE LABORATORY EXAMINATION: ICD-10-CM

## 2025-07-05 PROCEDURE — 71046 X-RAY EXAM CHEST 2 VIEWS: CPT

## 2025-07-08 ENCOUNTER — APPOINTMENT (OUTPATIENT)
Dept: RADIOLOGY | Facility: MEDICAL CENTER | Age: 50
DRG: 026 | End: 2025-07-08
Attending: NEUROLOGICAL SURGERY
Payer: MEDICAID

## 2025-07-08 ENCOUNTER — ANESTHESIA EVENT (OUTPATIENT)
Dept: SURGERY | Facility: MEDICAL CENTER | Age: 50
DRG: 026 | End: 2025-07-08
Payer: MEDICAID

## 2025-07-08 ENCOUNTER — HOSPITAL ENCOUNTER (INPATIENT)
Facility: MEDICAL CENTER | Age: 50
LOS: 2 days | DRG: 026 | End: 2025-07-10
Attending: NEUROLOGICAL SURGERY | Admitting: NEUROLOGICAL SURGERY
Payer: MEDICAID

## 2025-07-08 ENCOUNTER — APPOINTMENT (OUTPATIENT)
Dept: RADIOLOGY | Facility: MEDICAL CENTER | Age: 50
DRG: 026 | End: 2025-07-08
Attending: NURSE PRACTITIONER
Payer: MEDICAID

## 2025-07-08 ENCOUNTER — ANESTHESIA (OUTPATIENT)
Dept: SURGERY | Facility: MEDICAL CENTER | Age: 50
DRG: 026 | End: 2025-07-08
Payer: MEDICAID

## 2025-07-08 DIAGNOSIS — G93.89 BRAIN MASS: ICD-10-CM

## 2025-07-08 DIAGNOSIS — D49.6 BRAIN TUMOR (HCC): ICD-10-CM

## 2025-07-08 DIAGNOSIS — I10 PRIMARY HYPERTENSION: Primary | ICD-10-CM

## 2025-07-08 DIAGNOSIS — C71.9 OLIGODENDROGLIOMA (HCC): ICD-10-CM

## 2025-07-08 PROBLEM — R00.1 SINUS BRADYCARDIA: Status: ACTIVE | Noted: 2025-07-08

## 2025-07-08 PROBLEM — R61 DIAPHORESIS: Status: ACTIVE | Noted: 2025-07-08

## 2025-07-08 LAB
ABO GROUP BLD: NORMAL
ANION GAP SERPL CALC-SCNC: 11 MMOL/L (ref 7–16)
BLD GP AB SCN SERPL QL: NORMAL
BUN SERPL-MCNC: 17 MG/DL (ref 8–22)
CALCIUM SERPL-MCNC: 8.8 MG/DL (ref 8.5–10.5)
CHLORIDE SERPL-SCNC: 107 MMOL/L (ref 96–112)
CO2 SERPL-SCNC: 18 MMOL/L (ref 20–33)
CREAT SERPL-MCNC: 1.02 MG/DL (ref 0.5–1.4)
EKG IMPRESSION: NORMAL
ERYTHROCYTE [DISTWIDTH] IN BLOOD BY AUTOMATED COUNT: 40.7 FL (ref 35.9–50)
GFR SERPLBLD CREATININE-BSD FMLA CKD-EPI: 90 ML/MIN/1.73 M 2
GLUCOSE BLD STRIP.AUTO-MCNC: 128 MG/DL (ref 65–99)
GLUCOSE SERPL-MCNC: 185 MG/DL (ref 65–99)
HCT VFR BLD AUTO: 44.8 % (ref 42–52)
HGB BLD-MCNC: 14.7 G/DL (ref 14–18)
MAGNESIUM SERPL-MCNC: 2.4 MG/DL (ref 1.5–2.5)
MCH RBC QN AUTO: 28.3 PG (ref 27–33)
MCHC RBC AUTO-ENTMCNC: 32.8 G/DL (ref 32.3–36.5)
MCV RBC AUTO: 86.3 FL (ref 81.4–97.8)
PATHOLOGY CONSULT NOTE: NORMAL
PHOSPHATE SERPL-MCNC: 3 MG/DL (ref 2.5–4.5)
PLATELET # BLD AUTO: 299 K/UL (ref 164–446)
PMV BLD AUTO: 8.3 FL (ref 9–12.9)
POTASSIUM SERPL-SCNC: 4.9 MMOL/L (ref 3.6–5.5)
RBC # BLD AUTO: 5.19 M/UL (ref 4.7–6.1)
RH BLD: NORMAL
SODIUM SERPL-SCNC: 136 MMOL/L (ref 135–145)
TROPONIN T SERPL-MCNC: <6 NG/L (ref 6–19)
TROPONIN T SERPL-MCNC: <6 NG/L (ref 6–19)
WBC # BLD AUTO: 15 K/UL (ref 4.8–10.8)

## 2025-07-08 PROCEDURE — 700111 HCHG RX REV CODE 636 W/ 250 OVERRIDE (IP): Mod: JZ

## 2025-07-08 PROCEDURE — 160048 HCHG OR STATISTICAL LEVEL 1-5: Performed by: NEUROLOGICAL SURGERY

## 2025-07-08 PROCEDURE — 93010 ELECTROCARDIOGRAM REPORT: CPT | Performed by: INTERNAL MEDICINE

## 2025-07-08 PROCEDURE — C1763 CONN TISS, NON-HUMAN: HCPCS | Performed by: NEUROLOGICAL SURGERY

## 2025-07-08 PROCEDURE — 70450 CT HEAD/BRAIN W/O DYE: CPT

## 2025-07-08 PROCEDURE — 82962 GLUCOSE BLOOD TEST: CPT | Performed by: STUDENT IN AN ORGANIZED HEALTH CARE EDUCATION/TRAINING PROGRAM

## 2025-07-08 PROCEDURE — 88377 M/PHMTRC ALYS ISHQUANT/SEMIQ: CPT | Mod: 91

## 2025-07-08 PROCEDURE — 700101 HCHG RX REV CODE 250: Performed by: ANESTHESIOLOGY

## 2025-07-08 PROCEDURE — 81479 UNLISTED MOLECULAR PATHOLOGY: CPT

## 2025-07-08 PROCEDURE — 84100 ASSAY OF PHOSPHORUS: CPT

## 2025-07-08 PROCEDURE — 700101 HCHG RX REV CODE 250: Performed by: NEUROLOGICAL SURGERY

## 2025-07-08 PROCEDURE — 160015 HCHG STAT PREOP MINUTES: Performed by: NEUROLOGICAL SURGERY

## 2025-07-08 PROCEDURE — 88360 TUMOR IMMUNOHISTOCHEM/MANUAL: CPT | Mod: 26 | Performed by: PATHOLOGY

## 2025-07-08 PROCEDURE — C1713 ANCHOR/SCREW BN/BN,TIS/BN: HCPCS | Performed by: NEUROLOGICAL SURGERY

## 2025-07-08 PROCEDURE — 83735 ASSAY OF MAGNESIUM: CPT

## 2025-07-08 PROCEDURE — 88307 TISSUE EXAM BY PATHOLOGIST: CPT | Mod: 59 | Performed by: PATHOLOGY

## 2025-07-08 PROCEDURE — A9270 NON-COVERED ITEM OR SERVICE: HCPCS | Performed by: NURSE PRACTITIONER

## 2025-07-08 PROCEDURE — 700111 HCHG RX REV CODE 636 W/ 250 OVERRIDE (IP): Performed by: NEUROLOGICAL SURGERY

## 2025-07-08 PROCEDURE — 700102 HCHG RX REV CODE 250 W/ 637 OVERRIDE(OP): Performed by: NURSE PRACTITIONER

## 2025-07-08 PROCEDURE — 700111 HCHG RX REV CODE 636 W/ 250 OVERRIDE (IP): Mod: JZ | Performed by: ANESTHESIOLOGY

## 2025-07-08 PROCEDURE — 770022 HCHG ROOM/CARE - ICU (200)

## 2025-07-08 PROCEDURE — 700105 HCHG RX REV CODE 258: Performed by: ANESTHESIOLOGY

## 2025-07-08 PROCEDURE — 160195 HCHG PACU COMPLEX - 1ST 60 MINS: Performed by: NEUROLOGICAL SURGERY

## 2025-07-08 PROCEDURE — 88360 TUMOR IMMUNOHISTOCHEM/MANUAL: CPT | Performed by: PATHOLOGY

## 2025-07-08 PROCEDURE — A9270 NON-COVERED ITEM OR SERVICE: HCPCS

## 2025-07-08 PROCEDURE — 700111 HCHG RX REV CODE 636 W/ 250 OVERRIDE (IP): Mod: JZ | Performed by: NURSE PRACTITIONER

## 2025-07-08 PROCEDURE — 700105 HCHG RX REV CODE 258: Performed by: NURSE PRACTITIONER

## 2025-07-08 PROCEDURE — 160192 HCHG ANESTHESIA COMPLEX: Performed by: NEUROLOGICAL SURGERY

## 2025-07-08 PROCEDURE — 700102 HCHG RX REV CODE 250 W/ 637 OVERRIDE(OP)

## 2025-07-08 PROCEDURE — 160031 HCHG SURGERY MINUTES - 1ST 30 MINS LEVEL 5: Performed by: NEUROLOGICAL SURGERY

## 2025-07-08 PROCEDURE — 93005 ELECTROCARDIOGRAM TRACING: CPT | Mod: TC | Performed by: STUDENT IN AN ORGANIZED HEALTH CARE EDUCATION/TRAINING PROGRAM

## 2025-07-08 PROCEDURE — 700117 HCHG RX CONTRAST REV CODE 255: Mod: JZ | Performed by: NEUROLOGICAL SURGERY

## 2025-07-08 PROCEDURE — 700105 HCHG RX REV CODE 258: Performed by: NEUROLOGICAL SURGERY

## 2025-07-08 PROCEDURE — 99291 CRITICAL CARE FIRST HOUR: CPT | Performed by: STUDENT IN AN ORGANIZED HEALTH CARE EDUCATION/TRAINING PROGRAM

## 2025-07-08 PROCEDURE — 88342 IMHCHEM/IMCYTCHM 1ST ANTB: CPT | Performed by: PATHOLOGY

## 2025-07-08 PROCEDURE — 00B70ZZ EXCISION OF CEREBRAL HEMISPHERE, OPEN APPROACH: ICD-10-PCS | Performed by: NEUROLOGICAL SURGERY

## 2025-07-08 PROCEDURE — 86850 RBC ANTIBODY SCREEN: CPT

## 2025-07-08 PROCEDURE — 93005 ELECTROCARDIOGRAM TRACING: CPT | Mod: TC | Performed by: NEUROLOGICAL SURGERY

## 2025-07-08 PROCEDURE — 80048 BASIC METABOLIC PNL TOTAL CA: CPT

## 2025-07-08 PROCEDURE — 8E09XBZ COMPUTER ASSISTED PROCEDURE OF HEAD AND NECK REGION: ICD-10-PCS | Performed by: NEUROLOGICAL SURGERY

## 2025-07-08 PROCEDURE — 110454 HCHG SHELL REV 250: Performed by: NEUROLOGICAL SURGERY

## 2025-07-08 PROCEDURE — 160042 HCHG SURGERY MINUTES - EA ADDL 1 MIN LEVEL 5: Performed by: NEUROLOGICAL SURGERY

## 2025-07-08 PROCEDURE — 160002 HCHG RECOVERY MINUTES (STAT): Performed by: NEUROLOGICAL SURGERY

## 2025-07-08 PROCEDURE — 86901 BLOOD TYPING SEROLOGIC RH(D): CPT

## 2025-07-08 PROCEDURE — 88342 IMHCHEM/IMCYTCHM 1ST ANTB: CPT | Mod: 26 | Performed by: PATHOLOGY

## 2025-07-08 PROCEDURE — 70553 MRI BRAIN STEM W/O & W/DYE: CPT

## 2025-07-08 PROCEDURE — A9579 GAD-BASE MR CONTRAST NOS,1ML: HCPCS | Mod: JZ | Performed by: NEUROLOGICAL SURGERY

## 2025-07-08 PROCEDURE — 88307 TISSUE EXAM BY PATHOLOGIST: CPT | Mod: 26 | Performed by: PATHOLOGY

## 2025-07-08 PROCEDURE — 84484 ASSAY OF TROPONIN QUANT: CPT

## 2025-07-08 PROCEDURE — 110371 HCHG SHELL REV 272: Performed by: NEUROLOGICAL SURGERY

## 2025-07-08 PROCEDURE — 86900 BLOOD TYPING SEROLOGIC ABO: CPT

## 2025-07-08 PROCEDURE — 85027 COMPLETE CBC AUTOMATED: CPT

## 2025-07-08 DEVICE — PLATE NC BURR HOLE COVER FOR SHUNT 20MM (6NCX4=24): Type: IMPLANTABLE DEVICE | Site: CRANIAL | Status: FUNCTIONAL

## 2025-07-08 DEVICE — POWDER SURIGICAL 1GM COLLAGEN CELLERATE RX (1EA): Type: IMPLANTABLE DEVICE | Site: CRANIAL | Status: FUNCTIONAL

## 2025-07-08 DEVICE — DURAGUARD 4X4 - BOVINE PERICARDIUM: Type: IMPLANTABLE DEVICE | Site: CRANIAL | Status: FUNCTIONAL

## 2025-07-08 DEVICE — SCREW STRYK NC 1.5X5MM (6NCX40=240) (80EA/PK) CONSIGNED QTY 240 PRE-LOAD: Type: IMPLANTABLE DEVICE | Site: CRANIAL | Status: FUNCTIONAL

## 2025-07-08 DEVICE — PLATE NC BURR HOLE COVER FOR SHUNT 14MM (6NCX6=36): Type: IMPLANTABLE DEVICE | Site: CRANIAL | Status: FUNCTIONAL

## 2025-07-08 DEVICE — GRAFT DURAMATRIX ONLAY PLUS 3IN X 3IN OR 7.5CM X 7.5CM: Type: IMPLANTABLE DEVICE | Site: CRANIAL | Status: FUNCTIONAL

## 2025-07-08 RX ORDER — ROCURONIUM BROMIDE 10 MG/ML
INJECTION, SOLUTION INTRAVENOUS PRN
Status: DISCONTINUED | OUTPATIENT
Start: 2025-07-08 | End: 2025-07-08 | Stop reason: SURG

## 2025-07-08 RX ORDER — GADOTERIDOL 279.3 MG/ML
20 INJECTION INTRAVENOUS ONCE
Status: COMPLETED | OUTPATIENT
Start: 2025-07-08 | End: 2025-07-08

## 2025-07-08 RX ORDER — CEFAZOLIN SODIUM 1 G/3ML
INJECTION, POWDER, FOR SOLUTION INTRAMUSCULAR; INTRAVENOUS PRN
Status: DISCONTINUED | OUTPATIENT
Start: 2025-07-08 | End: 2025-07-08 | Stop reason: SURG

## 2025-07-08 RX ORDER — HYDROMORPHONE HYDROCHLORIDE 1 MG/ML
0.2 INJECTION, SOLUTION INTRAMUSCULAR; INTRAVENOUS; SUBCUTANEOUS
Status: DISCONTINUED | OUTPATIENT
Start: 2025-07-08 | End: 2025-07-08 | Stop reason: HOSPADM

## 2025-07-08 RX ORDER — OXYCODONE HYDROCHLORIDE 10 MG/1
10 TABLET ORAL
Status: DISCONTINUED | OUTPATIENT
Start: 2025-07-08 | End: 2025-07-10 | Stop reason: HOSPADM

## 2025-07-08 RX ORDER — OXYCODONE HCL 5 MG/5 ML
5 SOLUTION, ORAL ORAL
Status: DISCONTINUED | OUTPATIENT
Start: 2025-07-08 | End: 2025-07-08 | Stop reason: HOSPADM

## 2025-07-08 RX ORDER — DEXAMETHASONE SODIUM PHOSPHATE 4 MG/ML
4 INJECTION, SOLUTION INTRA-ARTICULAR; INTRALESIONAL; INTRAMUSCULAR; INTRAVENOUS; SOFT TISSUE
Status: DISCONTINUED | OUTPATIENT
Start: 2025-07-08 | End: 2025-07-08

## 2025-07-08 RX ORDER — LABETALOL HYDROCHLORIDE 5 MG/ML
10 INJECTION, SOLUTION INTRAVENOUS
Status: DISCONTINUED | OUTPATIENT
Start: 2025-07-08 | End: 2025-07-10 | Stop reason: HOSPADM

## 2025-07-08 RX ORDER — ACETAMINOPHEN 500 MG
1000 TABLET ORAL EVERY 6 HOURS PRN
Status: DISCONTINUED | OUTPATIENT
Start: 2025-07-13 | End: 2025-07-10 | Stop reason: HOSPADM

## 2025-07-08 RX ORDER — MIDAZOLAM HYDROCHLORIDE 1 MG/ML
INJECTION INTRAMUSCULAR; INTRAVENOUS PRN
Status: DISCONTINUED | OUTPATIENT
Start: 2025-07-08 | End: 2025-07-08 | Stop reason: SURG

## 2025-07-08 RX ORDER — HYDROMORPHONE HYDROCHLORIDE 2 MG/ML
INJECTION, SOLUTION INTRAMUSCULAR; INTRAVENOUS; SUBCUTANEOUS PRN
Status: DISCONTINUED | OUTPATIENT
Start: 2025-07-08 | End: 2025-07-08 | Stop reason: SURG

## 2025-07-08 RX ORDER — ONDANSETRON 2 MG/ML
INJECTION INTRAMUSCULAR; INTRAVENOUS PRN
Status: DISCONTINUED | OUTPATIENT
Start: 2025-07-08 | End: 2025-07-08 | Stop reason: SURG

## 2025-07-08 RX ORDER — LABETALOL HYDROCHLORIDE 5 MG/ML
10 INJECTION, SOLUTION INTRAVENOUS
Status: DISCONTINUED | OUTPATIENT
Start: 2025-07-08 | End: 2025-07-08

## 2025-07-08 RX ORDER — VASOPRESSIN 20 [USP'U]/ML
INJECTION, SOLUTION INTRAVENOUS PRN
Status: DISCONTINUED | OUTPATIENT
Start: 2025-07-08 | End: 2025-07-08 | Stop reason: SURG

## 2025-07-08 RX ORDER — GINSENG 100 MG
CAPSULE ORAL
Status: DISCONTINUED | OUTPATIENT
Start: 2025-07-08 | End: 2025-07-08 | Stop reason: HOSPADM

## 2025-07-08 RX ORDER — HYDROMORPHONE HYDROCHLORIDE 1 MG/ML
0.4 INJECTION, SOLUTION INTRAMUSCULAR; INTRAVENOUS; SUBCUTANEOUS
Status: DISCONTINUED | OUTPATIENT
Start: 2025-07-08 | End: 2025-07-08 | Stop reason: HOSPADM

## 2025-07-08 RX ORDER — DIPHENHYDRAMINE HYDROCHLORIDE 50 MG/ML
25 INJECTION, SOLUTION INTRAMUSCULAR; INTRAVENOUS EVERY 6 HOURS PRN
Status: DISCONTINUED | OUTPATIENT
Start: 2025-07-08 | End: 2025-07-08

## 2025-07-08 RX ORDER — POLYETHYLENE GLYCOL 3350 17 G/17G
1 POWDER, FOR SOLUTION ORAL 2 TIMES DAILY PRN
Status: DISCONTINUED | OUTPATIENT
Start: 2025-07-08 | End: 2025-07-10 | Stop reason: HOSPADM

## 2025-07-08 RX ORDER — OXYCODONE HCL 5 MG/5 ML
10 SOLUTION, ORAL ORAL
Status: DISCONTINUED | OUTPATIENT
Start: 2025-07-08 | End: 2025-07-08 | Stop reason: HOSPADM

## 2025-07-08 RX ORDER — SODIUM PHOSPHATE,MONO-DIBASIC 19G-7G/118
1 ENEMA (ML) RECTAL
Status: DISCONTINUED | OUTPATIENT
Start: 2025-07-08 | End: 2025-07-10 | Stop reason: HOSPADM

## 2025-07-08 RX ORDER — AMOXICILLIN 250 MG
1 CAPSULE ORAL NIGHTLY
Status: DISCONTINUED | OUTPATIENT
Start: 2025-07-08 | End: 2025-07-10 | Stop reason: HOSPADM

## 2025-07-08 RX ORDER — LEVETIRACETAM 500 MG/5ML
INJECTION, SOLUTION, CONCENTRATE INTRAVENOUS PRN
Status: DISCONTINUED | OUTPATIENT
Start: 2025-07-08 | End: 2025-07-08 | Stop reason: SURG

## 2025-07-08 RX ORDER — METOPROLOL TARTRATE 1 MG/ML
INJECTION, SOLUTION INTRAVENOUS PRN
Status: DISCONTINUED | OUTPATIENT
Start: 2025-07-08 | End: 2025-07-08 | Stop reason: SURG

## 2025-07-08 RX ORDER — PAPAVERINE HYDROCHLORIDE 30 MG/ML
INJECTION INTRAMUSCULAR; INTRAVENOUS
Status: DISCONTINUED | OUTPATIENT
Start: 2025-07-08 | End: 2025-07-08 | Stop reason: HOSPADM

## 2025-07-08 RX ORDER — ACETAMINOPHEN 500 MG
1000 TABLET ORAL EVERY 6 HOURS
Status: DISCONTINUED | OUTPATIENT
Start: 2025-07-08 | End: 2025-07-10 | Stop reason: HOSPADM

## 2025-07-08 RX ORDER — LIDOCAINE HYDROCHLORIDE 20 MG/ML
INJECTION, SOLUTION EPIDURAL; INFILTRATION; INTRACAUDAL; PERINEURAL PRN
Status: DISCONTINUED | OUTPATIENT
Start: 2025-07-08 | End: 2025-07-08 | Stop reason: SURG

## 2025-07-08 RX ORDER — ACETAMINOPHEN 500 MG
1000 TABLET ORAL ONCE
Status: DISCONTINUED | OUTPATIENT
Start: 2025-07-08 | End: 2025-07-08 | Stop reason: HOSPADM

## 2025-07-08 RX ORDER — HYDRALAZINE HYDROCHLORIDE 20 MG/ML
10 INJECTION INTRAMUSCULAR; INTRAVENOUS
Status: DISCONTINUED | OUTPATIENT
Start: 2025-07-08 | End: 2025-07-10 | Stop reason: HOSPADM

## 2025-07-08 RX ORDER — ONDANSETRON 2 MG/ML
4 INJECTION INTRAMUSCULAR; INTRAVENOUS
Status: DISCONTINUED | OUTPATIENT
Start: 2025-07-08 | End: 2025-07-08 | Stop reason: HOSPADM

## 2025-07-08 RX ORDER — HYDRALAZINE HYDROCHLORIDE 20 MG/ML
INJECTION INTRAMUSCULAR; INTRAVENOUS
Status: COMPLETED
Start: 2025-07-08 | End: 2025-07-08

## 2025-07-08 RX ORDER — HALOPERIDOL 5 MG/ML
1 INJECTION INTRAMUSCULAR
Status: DISCONTINUED | OUTPATIENT
Start: 2025-07-08 | End: 2025-07-08 | Stop reason: HOSPADM

## 2025-07-08 RX ORDER — AMOXICILLIN 250 MG
1 CAPSULE ORAL
Status: DISCONTINUED | OUTPATIENT
Start: 2025-07-08 | End: 2025-07-10 | Stop reason: HOSPADM

## 2025-07-08 RX ORDER — METOPROLOL TARTRATE 1 MG/ML
1 INJECTION, SOLUTION INTRAVENOUS
Status: DISCONTINUED | OUTPATIENT
Start: 2025-07-08 | End: 2025-07-08 | Stop reason: HOSPADM

## 2025-07-08 RX ORDER — MEPERIDINE HYDROCHLORIDE 50 MG/ML
12.5 INJECTION INTRAMUSCULAR; INTRAVENOUS; SUBCUTANEOUS
Status: DISCONTINUED | OUTPATIENT
Start: 2025-07-08 | End: 2025-07-08 | Stop reason: HOSPADM

## 2025-07-08 RX ORDER — CLONIDINE HYDROCHLORIDE 0.1 MG/1
0.1 TABLET ORAL EVERY 4 HOURS PRN
Status: DISCONTINUED | OUTPATIENT
Start: 2025-07-08 | End: 2025-07-08

## 2025-07-08 RX ORDER — HYDRALAZINE HYDROCHLORIDE 20 MG/ML
10 INJECTION INTRAMUSCULAR; INTRAVENOUS
Status: DISCONTINUED | OUTPATIENT
Start: 2025-07-08 | End: 2025-07-08

## 2025-07-08 RX ORDER — IPRATROPIUM BROMIDE AND ALBUTEROL SULFATE 2.5; .5 MG/3ML; MG/3ML
3 SOLUTION RESPIRATORY (INHALATION)
Status: DISCONTINUED | OUTPATIENT
Start: 2025-07-08 | End: 2025-07-08 | Stop reason: HOSPADM

## 2025-07-08 RX ORDER — DIPHENHYDRAMINE HYDROCHLORIDE 50 MG/ML
12.5 INJECTION, SOLUTION INTRAMUSCULAR; INTRAVENOUS
Status: DISCONTINUED | OUTPATIENT
Start: 2025-07-08 | End: 2025-07-08 | Stop reason: HOSPADM

## 2025-07-08 RX ORDER — DEXAMETHASONE SODIUM PHOSPHATE 4 MG/ML
4 INJECTION, SOLUTION INTRA-ARTICULAR; INTRALESIONAL; INTRAMUSCULAR; INTRAVENOUS; SOFT TISSUE EVERY 6 HOURS
Status: DISCONTINUED | OUTPATIENT
Start: 2025-07-08 | End: 2025-07-10

## 2025-07-08 RX ORDER — SODIUM CHLORIDE, SODIUM LACTATE, POTASSIUM CHLORIDE, CALCIUM CHLORIDE 600; 310; 30; 20 MG/100ML; MG/100ML; MG/100ML; MG/100ML
INJECTION, SOLUTION INTRAVENOUS CONTINUOUS
Status: ACTIVE | OUTPATIENT
Start: 2025-07-08 | End: 2025-07-08

## 2025-07-08 RX ORDER — LEVETIRACETAM 500 MG/1
500 TABLET ORAL 2 TIMES DAILY
Status: DISCONTINUED | OUTPATIENT
Start: 2025-07-08 | End: 2025-07-10 | Stop reason: HOSPADM

## 2025-07-08 RX ORDER — DEXAMETHASONE SODIUM PHOSPHATE 4 MG/ML
INJECTION, SOLUTION INTRA-ARTICULAR; INTRALESIONAL; INTRAMUSCULAR; INTRAVENOUS; SOFT TISSUE PRN
Status: DISCONTINUED | OUTPATIENT
Start: 2025-07-08 | End: 2025-07-08 | Stop reason: SURG

## 2025-07-08 RX ORDER — DOCUSATE SODIUM 100 MG/1
100 CAPSULE, LIQUID FILLED ORAL 2 TIMES DAILY
Status: DISCONTINUED | OUTPATIENT
Start: 2025-07-08 | End: 2025-07-10 | Stop reason: HOSPADM

## 2025-07-08 RX ORDER — SCOPOLAMINE 1 MG/3D
1 PATCH, EXTENDED RELEASE TRANSDERMAL
Status: DISCONTINUED | OUTPATIENT
Start: 2025-07-08 | End: 2025-07-08 | Stop reason: HOSPADM

## 2025-07-08 RX ORDER — DEXMEDETOMIDINE HYDROCHLORIDE 100 UG/ML
INJECTION, SOLUTION INTRAVENOUS PRN
Status: DISCONTINUED | OUTPATIENT
Start: 2025-07-08 | End: 2025-07-08 | Stop reason: SURG

## 2025-07-08 RX ORDER — OXYCODONE HYDROCHLORIDE 5 MG/1
5 TABLET ORAL
Status: DISCONTINUED | OUTPATIENT
Start: 2025-07-08 | End: 2025-07-10 | Stop reason: HOSPADM

## 2025-07-08 RX ORDER — SODIUM CHLORIDE 9 MG/ML
INJECTION, SOLUTION INTRAVENOUS CONTINUOUS
Status: DISCONTINUED | OUTPATIENT
Start: 2025-07-08 | End: 2025-07-09

## 2025-07-08 RX ORDER — HYDROMORPHONE HYDROCHLORIDE 1 MG/ML
0.1 INJECTION, SOLUTION INTRAMUSCULAR; INTRAVENOUS; SUBCUTANEOUS
Status: DISCONTINUED | OUTPATIENT
Start: 2025-07-08 | End: 2025-07-08 | Stop reason: HOSPADM

## 2025-07-08 RX ORDER — HYDROMORPHONE HYDROCHLORIDE 1 MG/ML
0.5 INJECTION, SOLUTION INTRAMUSCULAR; INTRAVENOUS; SUBCUTANEOUS
Status: DISCONTINUED | OUTPATIENT
Start: 2025-07-08 | End: 2025-07-10 | Stop reason: HOSPADM

## 2025-07-08 RX ORDER — SCOPOLAMINE 1 MG/3D
1 PATCH, EXTENDED RELEASE TRANSDERMAL
Status: DISCONTINUED | OUTPATIENT
Start: 2025-07-08 | End: 2025-07-08

## 2025-07-08 RX ORDER — BISACODYL 10 MG
10 SUPPOSITORY, RECTAL RECTAL
Status: DISCONTINUED | OUTPATIENT
Start: 2025-07-08 | End: 2025-07-10 | Stop reason: HOSPADM

## 2025-07-08 RX ORDER — ONDANSETRON 2 MG/ML
4 INJECTION INTRAMUSCULAR; INTRAVENOUS EVERY 4 HOURS PRN
Status: DISCONTINUED | OUTPATIENT
Start: 2025-07-08 | End: 2025-07-10 | Stop reason: HOSPADM

## 2025-07-08 RX ORDER — SODIUM CHLORIDE, SODIUM LACTATE, POTASSIUM CHLORIDE, CALCIUM CHLORIDE 600; 310; 30; 20 MG/100ML; MG/100ML; MG/100ML; MG/100ML
INJECTION, SOLUTION INTRAVENOUS CONTINUOUS
Status: DISCONTINUED | OUTPATIENT
Start: 2025-07-08 | End: 2025-07-08 | Stop reason: HOSPADM

## 2025-07-08 RX ORDER — HYDRALAZINE HYDROCHLORIDE 20 MG/ML
5 INJECTION INTRAMUSCULAR; INTRAVENOUS
Status: DISCONTINUED | OUTPATIENT
Start: 2025-07-08 | End: 2025-07-08 | Stop reason: HOSPADM

## 2025-07-08 RX ORDER — CEFAZOLIN SODIUM 1 G/3ML
INJECTION, POWDER, FOR SOLUTION INTRAMUSCULAR; INTRAVENOUS
Status: DISCONTINUED | OUTPATIENT
Start: 2025-07-08 | End: 2025-07-08 | Stop reason: HOSPADM

## 2025-07-08 RX ADMIN — HYDRALAZINE HYDROCHLORIDE 10 MG: 20 INJECTION INTRAMUSCULAR; INTRAVENOUS at 13:23

## 2025-07-08 RX ADMIN — HYDROMORPHONE HYDROCHLORIDE 1 MG: 2 INJECTION INTRAMUSCULAR; INTRAVENOUS; SUBCUTANEOUS at 11:32

## 2025-07-08 RX ADMIN — DEXMEDETOMIDINE 10 MCG: 100 INJECTION, SOLUTION INTRAVENOUS at 10:35

## 2025-07-08 RX ADMIN — SODIUM CHLORIDE, POTASSIUM CHLORIDE, SODIUM LACTATE AND CALCIUM CHLORIDE: 600; 310; 30; 20 INJECTION, SOLUTION INTRAVENOUS at 10:25

## 2025-07-08 RX ADMIN — CEFAZOLIN 2 G: 2 INJECTION, POWDER, FOR SOLUTION INTRAVENOUS at 17:12

## 2025-07-08 RX ADMIN — SUGAMMADEX 200 MG: 100 INJECTION, SOLUTION INTRAVENOUS at 11:32

## 2025-07-08 RX ADMIN — SODIUM CHLORIDE: 9 INJECTION, SOLUTION INTRAVENOUS at 23:03

## 2025-07-08 RX ADMIN — DEXAMETHASONE SODIUM PHOSPHATE 10 MG: 4 INJECTION INTRA-ARTICULAR; INTRALESIONAL; INTRAMUSCULAR; INTRAVENOUS; SOFT TISSUE at 08:12

## 2025-07-08 RX ADMIN — PROPOFOL 50 MG: 10 INJECTION, EMULSION INTRAVENOUS at 11:32

## 2025-07-08 RX ADMIN — PROPOFOL 50 MG: 10 INJECTION, EMULSION INTRAVENOUS at 11:20

## 2025-07-08 RX ADMIN — DEXAMETHASONE SODIUM PHOSPHATE 4 MG: 4 INJECTION, SOLUTION INTRA-ARTICULAR; INTRALESIONAL; INTRAMUSCULAR; INTRAVENOUS; SOFT TISSUE at 13:24

## 2025-07-08 RX ADMIN — PROPOFOL 100 MG: 10 INJECTION, EMULSION INTRAVENOUS at 08:45

## 2025-07-08 RX ADMIN — VASOPRESSIN 1 UNITS: 20 INJECTION INTRAVENOUS at 08:31

## 2025-07-08 RX ADMIN — PROPOFOL 100 MG: 10 INJECTION, EMULSION INTRAVENOUS at 08:24

## 2025-07-08 RX ADMIN — DOCUSATE SODIUM 100 MG: 100 CAPSULE, LIQUID FILLED ORAL at 17:11

## 2025-07-08 RX ADMIN — SODIUM CHLORIDE: 9 INJECTION, SOLUTION INTRAVENOUS at 13:29

## 2025-07-08 RX ADMIN — NOREPINEPHRINE BITARTRATE 0.05 MCG/KG/MIN: 1 INJECTION, SOLUTION, CONCENTRATE INTRAVENOUS at 08:31

## 2025-07-08 RX ADMIN — ROCURONIUM BROMIDE 50 MG: 10 INJECTION INTRAVENOUS at 08:12

## 2025-07-08 RX ADMIN — HYDROMORPHONE HYDROCHLORIDE 1 MG: 2 INJECTION INTRAMUSCULAR; INTRAVENOUS; SUBCUTANEOUS at 08:10

## 2025-07-08 RX ADMIN — LEVETIRACETAM 500 MG: 500 TABLET, FILM COATED ORAL at 17:12

## 2025-07-08 RX ADMIN — CEFAZOLIN 3 G: 1 INJECTION, POWDER, FOR SOLUTION INTRAMUSCULAR; INTRAVENOUS at 08:12

## 2025-07-08 RX ADMIN — LEVETIRACETAM 1000 MG: 100 INJECTION INTRAVENOUS at 08:44

## 2025-07-08 RX ADMIN — MIDAZOLAM HYDROCHLORIDE 2 MG: 1 INJECTION, SOLUTION INTRAMUSCULAR; INTRAVENOUS at 08:09

## 2025-07-08 RX ADMIN — DEXAMETHASONE SODIUM PHOSPHATE 4 MG: 4 INJECTION, SOLUTION INTRA-ARTICULAR; INTRALESIONAL; INTRAMUSCULAR; INTRAVENOUS; SOFT TISSUE at 17:12

## 2025-07-08 RX ADMIN — DEXMEDETOMIDINE 10 MCG: 100 INJECTION, SOLUTION INTRAVENOUS at 08:58

## 2025-07-08 RX ADMIN — PROPOFOL 200 MG: 10 INJECTION, EMULSION INTRAVENOUS at 08:12

## 2025-07-08 RX ADMIN — MINERAL OIL, PETROLATUM 1 APPLICATION: 425; 568 OINTMENT OPHTHALMIC at 13:34

## 2025-07-08 RX ADMIN — SODIUM CHLORIDE, POTASSIUM CHLORIDE, SODIUM LACTATE AND CALCIUM CHLORIDE: 600; 310; 30; 20 INJECTION, SOLUTION INTRAVENOUS at 06:59

## 2025-07-08 RX ADMIN — ONDANSETRON 4 MG: 2 INJECTION INTRAMUSCULAR; INTRAVENOUS at 11:32

## 2025-07-08 RX ADMIN — ROCURONIUM BROMIDE 50 MG: 10 INJECTION INTRAVENOUS at 08:42

## 2025-07-08 RX ADMIN — DOCUSATE SODIUM 50 MG AND SENNOSIDES 8.6 MG 1 TABLET: 8.6; 5 TABLET, FILM COATED ORAL at 20:12

## 2025-07-08 RX ADMIN — METOPROLOL TARTRATE 5 MG: 5 INJECTION INTRAVENOUS at 11:20

## 2025-07-08 RX ADMIN — ACETAMINOPHEN 1000 MG: 500 TABLET ORAL at 17:14

## 2025-07-08 RX ADMIN — ROCURONIUM BROMIDE 50 MG: 10 INJECTION INTRAVENOUS at 10:23

## 2025-07-08 RX ADMIN — OXYCODONE HYDROCHLORIDE 10 MG: 10 TABLET ORAL at 19:19

## 2025-07-08 RX ADMIN — GADOTERIDOL 20 ML: 279.3 INJECTION, SOLUTION INTRAVENOUS at 07:54

## 2025-07-08 RX ADMIN — LIDOCAINE HYDROCHLORIDE 100 MG: 20 INJECTION, SOLUTION EPIDURAL; INFILTRATION; INTRACAUDAL; PERINEURAL at 08:12

## 2025-07-08 ASSESSMENT — ENCOUNTER SYMPTOMS
VOMITING: 0
FEVER: 0
NAUSEA: 0
MUSCULOSKELETAL NEGATIVE: 1
SORE THROAT: 0
HEADACHES: 1
ABDOMINAL PAIN: 0
EYES NEGATIVE: 1
FOCAL WEAKNESS: 0
PALPITATIONS: 0
SPUTUM PRODUCTION: 0
SHORTNESS OF BREATH: 0
CHILLS: 0

## 2025-07-08 ASSESSMENT — FIBROSIS 4 INDEX
FIB4 SCORE: 0.61
FIB4 SCORE: 0.61

## 2025-07-08 ASSESSMENT — PAIN SCALES - GENERAL: PAIN_LEVEL: 0

## 2025-07-08 ASSESSMENT — PAIN DESCRIPTION - PAIN TYPE
TYPE: ACUTE PAIN

## 2025-07-08 NOTE — ASSESSMENT & PLAN NOTE
Unclear baseline  No hemodynamic effects  ECG without a block  Monitor for now    He improves to 70s when he wakes up and talks

## 2025-07-08 NOTE — OP REPORT
Neurosurgery Operative Note    Patient Name: Alexander Prasad MRN: [id number] YOB: 1975  Date of Surgery: 7/8/2025     SURGEON: Henna Johnson MD    ASSISTANT: AUSTIN Bright    PRE-OPERATIVE DIAGNOSIS:   Intra-axial mass, history of oligodendroglioma           POST-OPERATIVE DIAGNOSIS:   Intra-axial mass, history of oligodendroglioma           PROCEDURE:   Right frontal craniotomy for resection of recurrent intra-axial mass  Use of intraoperative intradural stereotactic neuronavigation  Use of IntraOp microscope           ANESTHESIA: GETA           ESTIMATED BLOOD LOSS: 150cc           DRAINS: Subgaleal BELGICA x 1    FINDINGS: Right frontal intra-axial mass           SPECIMENS:   ID Type Source Tests Collected by Time Destination   A : RIGHT FRONTAL LOBE LESION Tissue Brain PATHOLOGY SPECIMEN Henna Johnson M.D. 7/8/2025 10:30 AM    B : RIGHT FRONTAL LOBE LESION Tissue Brain PATHOLOGY SPECIMEN Henna Johnson M.D. 7/8/2025 10:40 AM               IMPLANTS:   Implant Name Type Inv. Item Serial No.  Lot No. LRB No. Used Action   DURAGUARD 4X4 - BOVINE PERICARDIUM Dura DURAGUARD 4X4 - BOVINE PERICARDIUM  [employer] - [id number] 1 Implanted              COMPLICATIONS: None apparent.    Operative Details:  The patient was identified in the pre-operative holding area by perioperative staff by two forms of identification. The patient was brought to the operating room, induced under general anesthesia and intubated without complication.  The Antibiotics, Keppra, and Decadron were administered. A Cosby catheter, IV access, and arterial line were placed by the Anesthesia team and nursing staff. The patient was positioned supine on the operating room table with the right arm tucked. All pressure points were padded. SCDs were on and functioning. The head was secured in a Teague headholder to 60lb of pressure and maintained in a neutral position. The Stealth  neuronavigation was registered to the patient's scalp landmarks. The tumor borders were mapped out on the scalp using neuronavigation.  The patient's previous bicoronal incision was identified.  The scalp was clipped of hair, prepped with isopropyl alcohol 4x4 sponges and Chloroprep. The patient was draped in the usual sterile fashion. A time-out indicated the correct patient, procedure and side.     The bicoronal incision was reopened with a 10-blade. Monopolar cautery was used to dissect through the subcutaneous tissue in the subperiosteal plane to raise a cutaneous flap anteriorly and posteriorly, and the skin edges were retracted with fishhooks.  The previous craniotomy and cranial hardware were identified.  The cranial hardware was removed in its entirety and discarded.   Estiven holes were created with a high-speed drill bit and expanded with a curette. The epidural plane was dissected with a Penfield 3 instrument. A B1 footplate was used to turn a craniotomy, and the flap was soaked on the back table. Hemostasis was achieved with bipolar cautery and thrombin foam. Tack-up holes were created circumferentially in the skull edge and the dura was tacked up with 4-0 Nuralon suture.     The dura was opened sharply with a 15 blade followed by a Metzenbaum scissor in a curved fashion, based on midline, and retracted medially with 4-0 Nurolon suture.  Upon inspection, there was clearly abnormal appearing tissue identified in the superior frontal gyrus, which corresponded to the location of the tumor using sterile tactic neuronavigation.  The posterior and lateral borders of the tumor were identified visually and with neuronavigation.  Bipolar cautery was used to create a corticectomy of the posterior and lateral border, and this was deepened with gentle suction.  In a systematic manner, a plane between a normal white matter and abnormal tissue was established using ultrasonic aspiration.  The plane was maintained with  cottonoid patties and Telfa patties.  The dissection continued along the posterior and lateral planes, working anteriorly as well until more normal white matter was identified.  I utilize stereotactic neuronavigation to guide the depth of resection to ensure complete tumor resection.  Multiple samples were taken for permanent section.  Dissection continued medially, with the superior frontal gyrus being carefully dissected from the falx by dividing any adherent christine or arachnoid.  The anterior cerebral artery branches were identified and protected.  At this point the microscope was draped and brought to the field.  Using right magnification and microsurgical technique I followed to the branches of the anterior cerebral artery and carefully coagulated each feeding artery to the abnormal tumor tissue under low bipolar power, and sharply divided it with microscissors.  This continued until the KWADWO was completely liberated from the affected frontal lobe.  The medial, deep dissection continued until the previous dissection planes were encountered, and much of the tumor was delivered in an en bloc fashion.  The cavity was fully inspected for additional tumor tissue, and utilizing sterile tactic neuronavigation, I continued to resect additional tumor from the anterior aspect of the resection cavity until normal appearing gray and white matter was encountered.      Once this was satisfactory, the field was copiously irrigated with Ancef infused saline.  Hemostasis was achieved with thrombin foam, sheets of fibrillar are, and Surgiflo as needed.  I applied papaverine soaked Gelfoam to the intracerebral artery branches for 2 minutes to relieve any vasospasm.  A DuraMatrix onlay plus was placed as a dural inlay, and a Dura-Guard was used to assist with repairing the dura utilizing running 4-0 Nurolon suture.  A dural tack up suture in the middle of the dura was placed, and passed through the bone flap, and secured.  The bone  flap was secured to the skull with titanium mini-plates and screws.  A 7 mm flat BELGICA drain was tunneled subcutaneously in the subgaleal space, and secured to the skin with a nylon suture.     The incision was closed in layers, interrupted inverted 2-0 Vicryl for galea and running 4-0 monocryl for the skin. The skin was cleansed and dressed with bacitracin.     The Kuo headholder was removed and the patient was extubated, and taken to the recovery room in a stable condition.     All counts were correct x2.      AGUSTINA Hernandez was present for all parts of the surgery, including the incision, exposure, critical portions of the procedure and closure.    A first assistant was required for assistance with exposure, retraction of brain tumor tissue with a Penfield 3 instrument during tumor resection, suctioning and irrigating the field during tumor resection, utilizing an ocular visualization on the observer piece of the microscope to assist with microsurgical dissection, applying cranial plates to the bone flap, apply manual tension to close the scalp.

## 2025-07-08 NOTE — OR SURGEON
Immediate Post OP Note    PreOp Diagnosis: brain tumor      PostOp Diagnosis:  brain tumor      Procedure(s):  STEALTH RIGHT FRONTAL CRANIOTOMY FOR TUMOR RESECTION - Wound Class: Clean with Drain    Surgeon(s):  Henna Johnson M.D.    Anesthesiologist/Type of Anesthesia:  Anesthesiologist: Jose Turner M.D./General    Surgical Staff:  Assistant: MYLA Bright  Circulator: Maribeth Petty R.N.  Operating Room Assistant (ORA): Cholo Pederson  Relief Circulator: Sherry Thomson R.N.  Scrub Person: Ashleigh Gonzales    Specimens removed if any:  ID Type Source Tests Collected by Time Destination   A : RIGHT FRONTAL LOBE LESION Tissue Brain PATHOLOGY SPECIMEN Henna Johnson M.D. 7/8/2025 10:30 AM    B : RIGHT FRONTAL LOBE LESION Tissue Brain PATHOLOGY SPECIMEN Henna Johnson M.D. 7/8/2025 10:40 AM        Estimated Blood Loss: 150cc    Findings: right frontal brain tumor    Complications: none apparent        7/8/2025 11:45 AM Henna Johnson M.D.

## 2025-07-08 NOTE — ANESTHESIA POSTPROCEDURE EVALUATION
Patient: Alexander Prasad    Procedure Summary       Date: 07/08/25 Room / Location: [facility] 05 / SURGERY [facility]    Anesthesia Start: 0805 Anesthesia Stop: 1151    Procedure: STEALTH RIGHT FRONTAL CRANIOTOMY FOR TUMOR RESECTION (Right: Head) Diagnosis: (ADULT OLIGODENDROGLIOMA)    Surgeons: Henna Johnson M.D. Responsible Provider: Jose Turner M.D.    Anesthesia Type: general ASA Status: 3            Final Anesthesia Type: general  Last vitals  BP   Blood Pressure: (!) 153/86, Arterial BP: 134/81    Temp   (!) 35.5 °C (95.9 °F)    Pulse   (!) 51   Resp   16    SpO2   94 %      Anesthesia Post Evaluation    Patient location during evaluation: PACU  Patient participation: complete - patient participated  Level of consciousness: sleepy but conscious  Pain score: 0    Airway patency: patent  Anesthetic complications: no  Cardiovascular status: bradycardic, hypertensive and hemodynamically stable  Respiratory status: acceptable  Hydration status: balanced    PONV: none          There were no known notable events for this encounter.     Nurse Pain Score: 0 (NPRS)

## 2025-07-08 NOTE — ANESTHESIA PROCEDURE NOTES
Airway    Date/Time: 7/8/2025 8:13 AM    Performed by: Jose Turner M.D.  Authorized by: Jose Turner M.D.    Location:  OR  Urgency:  Elective  Indications for Airway Management:  Anesthesia      Spontaneous Ventilation: absent    Sedation Level:  Deep  Preoxygenated: Yes    Patient Position:  Sniffing  Final Airway Type:  Endotracheal airway  Final Endotracheal Airway:  ETT  Cuffed: Yes    Technique Used for Successful ETT Placement:  Direct laryngoscopy  Devices/Methods Used in Placement:  Intentional mainstem    Insertion Site:  Oral  Blade Type:  Glide  Laryngoscope Blade/Videolaryngoscope Blade Size:  4  ETT Size (mm):  8.0  Measured from:  Teeth  ETT to Teeth (cm):  22  Placement Verified by: auscultation and capnometry    Cormack-Lehane Classification:  Grade I - full view of glottis  Number of Attempts at Approach:  1

## 2025-07-08 NOTE — OR NURSING
1143 Pt arrived to PACU with Anesthesiologist and OR RN. Head dressing CDI.    1155: Dr. Johnson at bedside. Neuro assessment done by MD.    1233: POC update given to Tristan over the phone. All questions answered.     1225 Pt meets ICU criteria. Report called to SARAHI Rai.     1234 Pt transported to R106 with 2 RN, monitor, full O2 tank. Chart and belongings with patient.

## 2025-07-08 NOTE — ASSESSMENT & PLAN NOTE
Surgical resection of right frontal mass with Dr Johnson on 7/8  Previously resected grade 2 oligodendroglioma in 2020    Admit to ICU  Q1 neuro checks for now  Continue Keppra for seizures  Pain control  Decadron per nsgy    SBP <160  - I am actively titrating a nicardipine infusion for SBP <160  - PRNs available    Monitor drain output

## 2025-07-08 NOTE — PROGRESS NOTES
He still has no complaints, but is quite diaphoretic.  Denies any chest pain (or any pain at all).  He is somnolent but does wake up with repeated stimuli and follows commands without any significant deficits.  Weaker on the left but able to elevate his arm against gravity.    ECG is evolving since this morning with deepening T wave inversions V2-V6.    No chest pain    Diaphoresis and changing ECG is potentially concerning, I am going to trend troponin, and repeat another ECG in an hour.  Still could be related to surgery, can also see deep T wave inversions with increased ICH but his post op head CT isn't immediately concerning for this    Plan  - check labs now  - trend trop x3  - recheck ECG in an hour (or sooner if active chest pain)  - I will relay this all to neurosurgery to keep them in the loop      ADDENDUM 1525  - ECG has not evolved which is a good sign  - neuro exam is unchanged, he still has no complaints  - 1st troponin is undetectably low, so this is also a good sign  - again, unlikely ACS, but felt prudent to run it down with dynamic ECG changes.  - If there is a change in neuro exam or significant increase in drain output I will repeat head CT and notify neurosurgery    Aman Deng M.D.

## 2025-07-08 NOTE — ANESTHESIA PREPROCEDURE EVALUATION
" Case: [id number] Date/Time: 07/08/25 0715    Procedure: STEALTH RIGHT FRONTAL CRANIOTOMY FOR TUMOR RESECTION    Pre-op diagnosis: ADULT OLIGODENDROGLIOMA    Location: [facility] OR 05 / SURGERY [facility]    Surgeons: Henna Johnson M.D.            Past Medical History[1]  Last seizure \"about a month and half ago\"  Reports seizure q1-2mo    Past Surgical History[2]    Current Outpatient Medications   Medication Instructions    levETIRAcetam (KEPPRA) 500 mg, Oral, 2 TIMES DAILY       Social History[3]    /87   Pulse 73   Temp 36.7 °C (98 °F) (Temporal)   Resp 17   Ht 1.803 m (5' 11\")   Wt (!) 149 kg (328 lb 4.2 oz)   SpO2 96%     Allergies[4]    Lab Results   Component Value Date/Time    SODIUM 138 07/01/2025 09:45 AM    POTASSIUM 4.1 07/01/2025 09:45 AM    CHLORIDE 106 07/01/2025 09:45 AM    GLUCOSE 97 07/01/2025 09:45 AM    BUN 14 07/01/2025 09:45 AM    CREATININE 1.08 07/01/2025 09:45 AM        Lab Results   Component Value Date/Time    WBC 9.8 07/01/2025 09:45 AM    RBC 5.19 07/01/2025 09:45 AM    HEMOGLOBIN 14.8 07/01/2025 09:45 AM    HEMATOCRIT 44.1 07/01/2025 09:45 AM    PLATELETCT 293 07/01/2025 09:45 AM        Relevant Problems   NEURO   (positive) Seizures (HCC)       Physical Exam    Airway   Mallampati: II  TM distance: >3 FB  Neck ROM: full       Cardiovascular - normal exam  Rhythm: regular  Rate: normal    (-) murmur     Dental              Pulmonary - normal examBreath sounds clear to auscultation     Abdominal (+) obese     Neurological - normal exam                   Anesthesia Plan    ASA 3   ASA physical status 3 criteria: morbid obesity - BMI greater than or equal to 40    Plan - general         (Arterial line)      Induction: intravenous    Postoperative Plan: Postoperative administration of opioids is intended.    Pertinent diagnostic labs and testing reviewed    Informed Consent:    Anesthetic plan and risks discussed with patient.    Use of blood products discussed with: patient whom " consented to blood products.       Anesthetic procedure and risks discussed with patient in detail.  Risks include but are not limited to PONV, pain, sore throat, damage to teeth/lips/gums, aspiration, positioning injury, allergic reaction, vocal cord injury, prolonged intubation, stroke, and/or cardiopulmonary problems up to and including death.  Patient indicates complete understanding. All questions fully answered and they agree to proceed with anesthesia as planned above.           [1]   Past Medical History:  Diagnosis Date    Blood clotting disorder (HCC) 2022    thinks he had a blood clot in his leg and was hospitalized    Breath shortness 06/27/2025    with exertion    Dental disorder 06/27/2025    missing most of his teeth    Imprisonment and other incarceration 05/18/2020    Psychiatric problem 06/27/2025    depression, hasn't followed up    Seizure (HCC) 06/27/2025    states has seizures one time per month, has been going on for a year   [2]   Past Surgical History:  Procedure Laterality Date    CRANIOTOMY TUMOR  2020    Performed in [facility]   [3]   Social History  Tobacco Use    Smoking status: Every Day     Current packs/day: 0.50     Average packs/day: 0.5 packs/day for 20.5 years (10.3 ttl pk-yrs)     Types: Cigarettes     Start date: 2005     Passive exposure: Past    Smokeless tobacco: Never    Tobacco comments:     5 cig/day; started smoking age 17.    Vaping Use    Vaping status: Never Used   Substance Use Topics    Alcohol use: Not Currently     Comment: h/o heavy drinking for a few years; sober since 2020    Drug use: Not Currently     Types: Methamphetamines     Comment: Meth abuse x 6 years; abstinent since 2020   [4]   Allergies  Allergen Reactions    Fentanyl Unspecified     Patient thinks he did not respond well to fentanyl during a procedure but is not sure what type of reaction he had

## 2025-07-08 NOTE — ASSESSMENT & PLAN NOTE
Improving  Cardiac workup negative  Continue to monitor    For what it's worth, he does say that this is fairly typical for him

## 2025-07-08 NOTE — PROGRESS NOTES
4 Eyes Skin Assessment Completed by SARAHI Rai and SARAHI Jo.    Skin assessment is primarily focused on high risk bony prominences. Pay special attention to skin beneath and around medical devices, high risk bony prominences, skin to skin areas and areas where the patient lacks sensation to feel pain and areas where the patient previously had breakdown.     Head (Occipital):  Incision   Ears (Under Medical Devices): Red and Blanching   Nose (Under Medical Devices): WDL   Mouth:  WDL   Neck: WDL   Breast/Chest:  WDL   Shoulder Blades:  WDL   Spine:   WDL   (R) Arm/Elbow/Hand: **Wound/discoloration of bony prominence (Suspected Pressure injury)**   (L) Arm/Elbow/Hand: Red and Blanching   Abdomen: WDL   Pannus/Groin:  WDL   Sacrum/Coccyx:   WDL   (R) Ischial Tuberosity (Sit Bones):  WDL   (L) Ischial Tuberosity (Sit Bones):  WDL   (R) Leg:  WDL   (L) Leg:  Round discolaration on shin   (R) Heel:  Red and Blanching   (R) Foot/Toe: WDL   (L) Heel: Red and Blanching   (L) Foot/Toe:  WDL       DEVICES IN USE:   Respiratory Devices:  Nasal cannula  Feeding Devices:  N/A   Lines & BP Monitoring Devices:  peripheral IV, arterial line, BP cuff, pulse ox, tele leads  Orthopedic Devices:  N/A  Miscellaneous Devices:  Drains    PROTOCOL INTERVENTIONS:   ICU Low Airloss Bed:  Already in place  Q2 Turns with Pillows:  Already in place  Heels, elbows floated on pillows    WOUND PHOTOS:   Completed and in EPIC     WOUND CONSULT:   Consult ordered for the following areas left shin discoloration, left elbow discoloration

## 2025-07-08 NOTE — PROGRESS NOTES
Medication history reviewed with PT at bedside    [facility] is complete per PT reporting    Allergies reviewed.     Patient denies any outpatient antibiotics in the last 30 days.     Patient has not taken any antimicrobials (antivirals, antifungals and antiparasitics) in the last 30 days.    Patient is not taking anticoagulants.    Preferred pharmacy for this encounter - [facility] [address] ([phone])

## 2025-07-08 NOTE — ANESTHESIA TIME REPORT
Anesthesia Start and Stop Event Times       Date Time Event    7/8/2025 0720 Ready for Procedure     0805 Anesthesia Start     1151 Anesthesia Stop          Responsible Staff  07/08/25      Name Role Begin End    Jose Turner M.D. Anesth 0805 1151          Overtime Reason:  no overtime (within assigned shift)    Comments:

## 2025-07-08 NOTE — CONSULTS
Critical Care History & Physical    Date of consult: 07/08/25    Referring Physician  Aman Deng M.D.    Reason for Consultation  Intracranial tumor resection    History of Presenting Illness  49 y.o. male with a history of seizures and right frontal mass.  He had a prior craniotomy and resection in 2020 with path indicative of grade 2 oligodendroglioma.  He was not treated with chemotherapy at that time.  He has since moved to the area and reestablished care with a neurosurgeon after he began developing worsening symptoms and was found to have right frontal tumor.  On 7/8 he was admitted to the hospital for resection and was admitted to the ICU postoperatively.      Code Status  Full Code    Review of Systems  Review of Systems   Constitutional:  Negative for chills, fever and malaise/fatigue.   HENT:  Negative for congestion and sore throat.    Eyes: Negative.    Respiratory:  Negative for sputum production and shortness of breath.    Cardiovascular:  Negative for chest pain and palpitations.   Gastrointestinal:  Negative for abdominal pain, nausea and vomiting.   Genitourinary: Negative.    Musculoskeletal: Negative.    Skin: Negative.    Neurological:  Positive for headaches. Negative for focal weakness.   All other systems reviewed and are negative.      Past Medical History   has a past medical history of Blood clotting disorder (HCC) (2022), Breath shortness (06/27/2025), Dental disorder (06/27/2025), Imprisonment and other incarceration (05/18/2020), Psychiatric problem (06/27/2025), and Seizure (HCC) (06/27/2025).    Surgical History   has a past surgical history that includes craniotomy tumor (2020).    Family History  Reviewed and not pertinent    Social History   reports that he has been smoking cigarettes. He started smoking about 20 years ago. He has a 10.3 pack-year smoking history. He has been exposed to tobacco smoke. He has never used smokeless tobacco. He reports that he does not currently use  alcohol. He reports that he does not currently use drugs after having used the following drugs: Methamphetamines.    Medications  Home Medications       Reviewed by Maribeth Petty R.N. (Registered Nurse) on 07/08/25 at 0709  Med List Status: Not Addressed     Medication Last Dose Status   lactated ringers infusion  Active   levETIRAcetam (KEPPRA) 500 MG Tab 7/8/2025 Active                  Audit from Redirected Encounters    **Home medications have not yet been reviewed for this encounter**         Allergies  Allergies[1]      Vital Signs last 24 hours  Temp:  [35.5 °C (95.9 °F)-36.9 °C (98.4 °F)] 35.5 °C (95.9 °F)  Pulse:  [51-73] 51  Resp:  [12-17] 16  BP: (129-153)/(75-87) 153/86  SpO2:  [94 %-97 %] 94 %      Physical Exam   Physical Exam  Vitals and nursing note reviewed. Exam conducted with a chaperone present.   Constitutional:       General: He is sleeping. He is not in acute distress.     Appearance: Normal appearance. He is diaphoretic.   HENT:      Head: Normocephalic.      Comments: Large frontal incision with sutures intact  BELGICA drain from left front     Mouth/Throat:      Mouth: Mucous membranes are moist.   Eyes:      Extraocular Movements: Extraocular movements intact.   Cardiovascular:      Rate and Rhythm: Regular rhythm. Bradycardia present.      Pulses: Normal pulses.   Pulmonary:      Effort: Pulmonary effort is normal. No respiratory distress.   Abdominal:      General: There is no distension.      Palpations: Abdomen is soft.      Tenderness: There is no abdominal tenderness. There is no guarding or rebound.   Musculoskeletal:         General: Normal range of motion.      Cervical back: Normal range of motion and neck supple.   Skin:     General: Skin is warm.      Capillary Refill: Capillary refill takes less than 2 seconds.   Neurological:      General: No focal deficit present.      Mental Status: He is easily aroused.      Comments: Answers questions with nod / shaking of head and follows  commands    No focal deficits           Fluids    Intake/Output Summary (Last 24 hours) at 2025 1358  Last data filed at 2025 1320  Gross per 24 hour   Intake 1300 ml   Output 615 ml   Net 685 ml         Laboratory  Recent Results (from the past 48 hours)   COD - Adult (Type and Screen)    Collection Time: 25  6:55 AM   Result Value Ref Range    ABO Grouping Only B     Rh Grouping Only POS     Antibody Screen-Cod NEG    Histology Request    Collection Time: 25  7:15 AM   Result Value Ref Range    Pathology Request Sent to Histo    ECG    Collection Time: 25  7:19 AM   Result Value Ref Range    Report       [facility]    Test Date:  2025  Pt Name:    KIRTI MANUEL                Department: [facility] [[email or web address]]  MRN:        [id number]                      Room:       [facility]  Gender:     Male                         Technician: [facility]  :        1975                   Requested By:LINDA TIPTON  Order #:    [id number]                    Reading MD: Bart Yu MD    Measurements  Intervals                                Axis  Rate:       67                           P:          30  ID:         161                          QRS:        -56  QRSD:       113                          T:          33  QT:         411  QTc:        434    Interpretive Statements  Sinus rhythm  Left anterior fascicular block  Nonspecific T abnrm, anterolateral leads  No previous ECG available for comparison  Electronically Signed On 2025 07:18:59 PDT by Bart Yu MD           Imaging  CT-HEAD W/O   Final Result      1.  Interval right frontal craniotomy with moderate sized irregular postsurgical defect in the right anterior frontal lobe.   2.  Small amount of increased attenuation about the periphery of the resection cavity consistent with a small amount of hemorrhage.   3.  Nondependent pneumocephalus in the right frontal region and postoperative cavity.                  MR-[facility] BRAIN WITH & W/O    Final Result    Limited study   When compared with the previous MRI dated 4/25/2025 again noted is abnormal right frontal T2 hyperintense mass. There has been interval increase in the size of the enhancing area in the right frontal lesion. There is also mild overall interval increase    in the size of the lesion.            Assessment/Plan  * Brain mass- (present on admission)  Assessment & Plan  Surgical resection of right frontal mass with Dr Johnson on 7/8  Previously resected grade 2 oligodendroglioma in 2020    Admit to ICU  Q1 neuro checks for now  Continue Keppra for seizures  Pain control  Decadron per nsgy    SBP <160  - I am actively titrating a nicardipine infusion for SBP <160  - PRNs available    Monitor drain output    Diaphoresis  Assessment & Plan  Very sweaty post op  Blood sugar is normal  Neuro intact, wakes up and nods/shakes head to answer questions    Will continue to monitor for now, possibly effect of medications  Unlikely infectious given short timeframe    Sinus bradycardia  Assessment & Plan  Unclear baseline  No hemodynamic effects  ECG pending  Monitor for now    Seizure (HCC)  Assessment & Plan  Continue keppra        DVT prophylaxis: Per neurosurgery  PUD prophylaxis: NA  Glycemic control: SSI if needed  Nutrition: Advance as tolerated to regular diet  Lines: BELGICA from left front  Cos[place]: in place    Discussed patient condition and risk of morbidity and/or mortality with RN, RT, Pharmacy, , Charge nurse / hot rounds, Patient, and neurosurgery.      The patient remains critically ill.  I am actively titrating a nicardipine infusion for SBP <140 Critical care time = 45 minutes in directly providing and coordinating critical care and extensive data review.  No time overlap and excludes procedures.          [1] No Active Allergies

## 2025-07-08 NOTE — ANESTHESIA PROCEDURE NOTES
Arterial Line    Performed by: Jose Turner M.D.  Authorized by: Jose Turner M.D.    Start Time:  7/8/2025 8:18 AM  Localization: ultrasound guidance  Image captured, interpreted and electronically stored.  Patient Location:  OR  Indication: continuous blood pressure monitoring        Catheter Size:  20 G  Seldinger Technique?: Yes    Laterality:  Left  Site:  Radial artery  Line Secured:  Antimicrobial disc, tape and transparent dressing  Events: patient tolerated procedure well with no complications     Placed with two attempts under direct U/S guidance

## 2025-07-09 ENCOUNTER — APPOINTMENT (OUTPATIENT)
Dept: CARDIOLOGY | Facility: MEDICAL CENTER | Age: 50
DRG: 026 | End: 2025-07-09
Attending: HOSPITALIST
Payer: MEDICAID

## 2025-07-09 PROBLEM — E66.01 MORBID OBESITY (HCC): Status: ACTIVE | Noted: 2025-07-09

## 2025-07-09 LAB
ANION GAP SERPL CALC-SCNC: 13 MMOL/L (ref 7–16)
BUN SERPL-MCNC: 16 MG/DL (ref 8–22)
CALCIUM SERPL-MCNC: 8.3 MG/DL (ref 8.5–10.5)
CHLORIDE SERPL-SCNC: 107 MMOL/L (ref 96–112)
CO2 SERPL-SCNC: 17 MMOL/L (ref 20–33)
CREAT SERPL-MCNC: 1.06 MG/DL (ref 0.5–1.4)
ERYTHROCYTE [DISTWIDTH] IN BLOOD BY AUTOMATED COUNT: 41.1 FL (ref 35.9–50)
GFR SERPLBLD CREATININE-BSD FMLA CKD-EPI: 86 ML/MIN/1.73 M 2
GLUCOSE BLD STRIP.AUTO-MCNC: 145 MG/DL (ref 65–99)
GLUCOSE SERPL-MCNC: 164 MG/DL (ref 65–99)
HCT VFR BLD AUTO: 40.9 % (ref 42–52)
HGB BLD-MCNC: 13.8 G/DL (ref 14–18)
LV EJECT FRACT  99904: 70
LV EJECT FRACT MOD 2C 99903: 69.1
LV EJECT FRACT MOD 4C 99902: 66.45
LV EJECT FRACT MOD BP 99901: 68.02
MAGNESIUM SERPL-MCNC: 2 MG/DL (ref 1.5–2.5)
MCH RBC QN AUTO: 29.2 PG (ref 27–33)
MCHC RBC AUTO-ENTMCNC: 33.7 G/DL (ref 32.3–36.5)
MCV RBC AUTO: 86.5 FL (ref 81.4–97.8)
PHOSPHATE SERPL-MCNC: 2.5 MG/DL (ref 2.5–4.5)
PLATELET # BLD AUTO: 318 K/UL (ref 164–446)
PMV BLD AUTO: 8.4 FL (ref 9–12.9)
POTASSIUM SERPL-SCNC: 4.2 MMOL/L (ref 3.6–5.5)
RBC # BLD AUTO: 4.73 M/UL (ref 4.7–6.1)
SODIUM SERPL-SCNC: 137 MMOL/L (ref 135–145)
TROPONIN T SERPL-MCNC: <6 NG/L (ref 6–19)
TSH SERPL DL<=0.005 MIU/L-ACNC: 0.66 UIU/ML (ref 0.38–5.33)
WBC # BLD AUTO: 23.1 K/UL (ref 4.8–10.8)

## 2025-07-09 PROCEDURE — 700111 HCHG RX REV CODE 636 W/ 250 OVERRIDE (IP): Performed by: NURSE PRACTITIONER

## 2025-07-09 PROCEDURE — 97602 WOUND(S) CARE NON-SELECTIVE: CPT

## 2025-07-09 PROCEDURE — 97162 PT EVAL MOD COMPLEX 30 MIN: CPT

## 2025-07-09 PROCEDURE — 80048 BASIC METABOLIC PNL TOTAL CA: CPT

## 2025-07-09 PROCEDURE — 99254 IP/OBS CNSLTJ NEW/EST MOD 60: CPT | Performed by: HOSPITALIST

## 2025-07-09 PROCEDURE — A9270 NON-COVERED ITEM OR SERVICE: HCPCS | Performed by: NURSE PRACTITIONER

## 2025-07-09 PROCEDURE — 84484 ASSAY OF TROPONIN QUANT: CPT

## 2025-07-09 PROCEDURE — 700111 HCHG RX REV CODE 636 W/ 250 OVERRIDE (IP): Performed by: STUDENT IN AN ORGANIZED HEALTH CARE EDUCATION/TRAINING PROGRAM

## 2025-07-09 PROCEDURE — 82962 GLUCOSE BLOOD TEST: CPT | Performed by: STUDENT IN AN ORGANIZED HEALTH CARE EDUCATION/TRAINING PROGRAM

## 2025-07-09 PROCEDURE — 99233 SBSQ HOSP IP/OBS HIGH 50: CPT | Performed by: STUDENT IN AN ORGANIZED HEALTH CARE EDUCATION/TRAINING PROGRAM

## 2025-07-09 PROCEDURE — 84443 ASSAY THYROID STIM HORMONE: CPT

## 2025-07-09 PROCEDURE — 700102 HCHG RX REV CODE 250 W/ 637 OVERRIDE(OP): Performed by: NURSE PRACTITIONER

## 2025-07-09 PROCEDURE — 93306 TTE W/DOPPLER COMPLETE: CPT

## 2025-07-09 PROCEDURE — 83735 ASSAY OF MAGNESIUM: CPT

## 2025-07-09 PROCEDURE — 93306 TTE W/DOPPLER COMPLETE: CPT | Mod: 26 | Performed by: INTERNAL MEDICINE

## 2025-07-09 PROCEDURE — 700105 HCHG RX REV CODE 258: Performed by: NURSE PRACTITIONER

## 2025-07-09 PROCEDURE — 84100 ASSAY OF PHOSPHORUS: CPT

## 2025-07-09 PROCEDURE — 86480 TB TEST CELL IMMUN MEASURE: CPT

## 2025-07-09 PROCEDURE — 85027 COMPLETE CBC AUTOMATED: CPT

## 2025-07-09 PROCEDURE — 770000 HCHG ROOM/CARE - INTERMEDIATE ICU *

## 2025-07-09 RX ADMIN — ACETAMINOPHEN 1000 MG: 500 TABLET ORAL at 11:41

## 2025-07-09 RX ADMIN — DOCUSATE SODIUM 100 MG: 100 CAPSULE, LIQUID FILLED ORAL at 17:52

## 2025-07-09 RX ADMIN — LEVETIRACETAM 500 MG: 500 TABLET, FILM COATED ORAL at 17:52

## 2025-07-09 RX ADMIN — DOCUSATE SODIUM 100 MG: 100 CAPSULE, LIQUID FILLED ORAL at 05:39

## 2025-07-09 RX ADMIN — LABETALOL HYDROCHLORIDE 10 MG: 5 INJECTION INTRAVENOUS at 20:22

## 2025-07-09 RX ADMIN — LEVETIRACETAM 500 MG: 500 TABLET, FILM COATED ORAL at 05:39

## 2025-07-09 RX ADMIN — OXYCODONE HYDROCHLORIDE 10 MG: 10 TABLET ORAL at 05:40

## 2025-07-09 RX ADMIN — DEXAMETHASONE SODIUM PHOSPHATE 4 MG: 4 INJECTION, SOLUTION INTRA-ARTICULAR; INTRALESIONAL; INTRAMUSCULAR; INTRAVENOUS; SOFT TISSUE at 00:28

## 2025-07-09 RX ADMIN — DEXAMETHASONE SODIUM PHOSPHATE 4 MG: 4 INJECTION, SOLUTION INTRA-ARTICULAR; INTRALESIONAL; INTRAMUSCULAR; INTRAVENOUS; SOFT TISSUE at 17:52

## 2025-07-09 RX ADMIN — HYDROMORPHONE HYDROCHLORIDE 0.5 MG: 1 INJECTION, SOLUTION INTRAMUSCULAR; INTRAVENOUS; SUBCUTANEOUS at 07:37

## 2025-07-09 RX ADMIN — DEXAMETHASONE SODIUM PHOSPHATE 4 MG: 4 INJECTION, SOLUTION INTRA-ARTICULAR; INTRALESIONAL; INTRAMUSCULAR; INTRAVENOUS; SOFT TISSUE at 11:41

## 2025-07-09 RX ADMIN — ACETAMINOPHEN 1000 MG: 500 TABLET ORAL at 17:52

## 2025-07-09 RX ADMIN — ACETAMINOPHEN 1000 MG: 500 TABLET ORAL at 00:27

## 2025-07-09 RX ADMIN — CEFAZOLIN 2 G: 2 INJECTION, POWDER, FOR SOLUTION INTRAVENOUS at 00:31

## 2025-07-09 RX ADMIN — DEXAMETHASONE SODIUM PHOSPHATE 4 MG: 4 INJECTION, SOLUTION INTRA-ARTICULAR; INTRALESIONAL; INTRAMUSCULAR; INTRAVENOUS; SOFT TISSUE at 05:39

## 2025-07-09 RX ADMIN — DOCUSATE SODIUM 50 MG AND SENNOSIDES 8.6 MG 1 TABLET: 8.6; 5 TABLET, FILM COATED ORAL at 20:08

## 2025-07-09 RX ADMIN — ACETAMINOPHEN 1000 MG: 500 TABLET ORAL at 05:39

## 2025-07-09 ASSESSMENT — PAIN DESCRIPTION - PAIN TYPE
TYPE: ACUTE PAIN

## 2025-07-09 ASSESSMENT — COGNITIVE AND FUNCTIONAL STATUS - GENERAL
SUGGESTED CMS G CODE MODIFIER DAILY ACTIVITY: CH
SUGGESTED CMS G CODE MODIFIER MOBILITY: CH
MOBILITY SCORE: 24
SUGGESTED CMS G CODE MODIFIER MOBILITY: CJ
MOVING TO AND FROM BED TO CHAIR: A LITTLE
DAILY ACTIVITIY SCORE: 24
STANDING UP FROM CHAIR USING ARMS: A LITTLE
MOBILITY SCORE: 20
CLIMB 3 TO 5 STEPS WITH RAILING: A LITTLE
WALKING IN HOSPITAL ROOM: A LITTLE

## 2025-07-09 ASSESSMENT — ENCOUNTER SYMPTOMS
FOCAL WEAKNESS: 0
DIAPHORESIS: 1
DOUBLE VISION: 0
NERVOUS/ANXIOUS: 0
SHORTNESS OF BREATH: 0
BLURRED VISION: 0
CHILLS: 0
PALPITATIONS: 0
ABDOMINAL PAIN: 0
FEVER: 0
SENSORY CHANGE: 0
SPEECH CHANGE: 0
HEADACHES: 0
NAUSEA: 0

## 2025-07-09 ASSESSMENT — GAIT ASSESSMENTS
DISTANCE (FEET): 35
GAIT LEVEL OF ASSIST: STANDBY ASSIST
ASSISTIVE DEVICE: FRONT WHEEL WALKER
DEVIATION: DECREASED HEEL STRIKE;DECREASED TOE OFF

## 2025-07-09 NOTE — CARE PLAN
Problem: Knowledge Deficit - Standard  Goal: Patient and family/care givers will demonstrate understanding of plan of care, disease process/condition, diagnostic tests and medications  Outcome: Progressing     Problem: Skin Integrity  Goal: Skin integrity is maintained or improved  Outcome: Progressing     Problem: Fall Risk  Goal: Patient will remain free from falls  Outcome: Progressing     Problem: Pain - Standard  Goal: Alleviation of pain or a reduction in pain to the patient’s comfort goal  Outcome: Progressing   The patient is Watcher - Medium risk of patient condition declining or worsening    Shift Goals  Clinical Goals: monitor HR, Q1 neuro checks  Patient Goals: stop sweating  Family Goals: berhane    Progress made toward(s) clinical / shift goals:  pt on tele monitoring to watch for bradycardia. Neuro checks now q4. Pt mobilized up to chair for meals. Pt not sweating as bad as beginning of shift     Patient is not progressing towards the following goals:

## 2025-07-09 NOTE — ASSESSMENT & PLAN NOTE
Body mass index is 45.8 kg/m².  Encouraged lifestyle modification, caloric restriction, increase activity in the near future.  Follow-up with primary for potential bariatric and lifestyle counseling  Needs an outpatient sleep study

## 2025-07-09 NOTE — PROGRESS NOTES
Neurosurgery Progress Note    Subjective:  Alexander states that he feels okay today.  A little bit headache.  Had some diaphoresis and bradycardic episodes overnight, but no intervention required.  Awake, alert, oriented x 3      Exam:  Face symmetric  No drift  Moves all extremities well antigravity and equally    Cranial incision clean, dry intact. Drain in place.    Recent Labs     07/08/25 1430 07/09/25  0423   WBC 15.0* 23.1*   RBC 5.19 4.73   HEMOGLOBIN 14.7 13.8*   HEMATOCRIT 44.8 40.9*   MCV 86.3 86.5   MCH 28.3 29.2   MCHC 32.8 33.7   RDW 40.7 41.1   PLATELETCT 299 318   MPV 8.3* 8.4*     Recent Labs     07/08/25  1430 07/09/25  0423   SODIUM 136 137   POTASSIUM 4.9 4.2   CHLORIDE 107 107   CO2 18* 17*   GLUCOSE 185* 164*   BUN 17 16   CREATININE 1.02 1.06   CALCIUM 8.8 8.3*               Intake/Output                         07/08/25 0700 - 07/09/25 0659 07/09/25 0700 - 07/10/25 0659     8640-1510 5931-9546 Total 4041-9124 3833-2872 Total                 Intake    I.V.  1750.2  1132.6 2882.8  --  -- --    Volume (mL) (lactated ringers infusion) 1300 -- 1300 -- -- --    Volume (mL) (NS infusion) 450.2 1132.6 1582.8 -- -- --    IV Piggyback  102.6  92.8 195.4  --  -- --    Volume (mL) (ceFAZolin (Ancef) 2 g in  mL IVPB) 102.6 92.8 195.4 -- -- --    Total Intake 1852.8 1225.4 3078.2 -- -- --       Output    Urine  600  950 1550  --  -- --    Urine 200 -- 200 -- -- --    Output (mL) ([REMOVED] Urethral Catheter Non-latex;Temperature probe 16 Fr. 07/09/25 0618)  -- -- --    Drains  175  145 320  50  -- 50    Output (mL) (Closed/Suction Drain 1 Right Scalp Riaz Covarrubias 7 Fr.) 175 145 320 50 -- 50    Stool  --  -- --  --  -- --    Number of Times Stooled 0 x -- 0 x -- -- --    Blood  300  -- 300  --  -- --    Est. Blood Loss 300 -- 300 -- -- --    Total Output 7422 8698 5410 50 -- 50       Net I/O     777.8 130.4 908.2 -50 -- -50            Imaging:    CT Exams CT-HEAD W/O at 7/8/2025 12:54  PM  Begin Exam: 12:33 PM  End Exam: 12:49 PM  Impression:   1.  Interval right frontal craniotomy with moderate sized irregular postsurgical defect in the right anterior frontal lobe.  2.  Small amount of increased attenuation about the periphery of the resection cavity consistent with a small amount of hemorrhage.  3.  Nondependent pneumocephalus in the right frontal region and postoperative cavity.             MRI Exams No results found.       Assessment and Plan:  Patient is hospital day # 2  Postop day #1 status post craniotomy for resection of recurrent glioma    Chemical prophylactic DVT therapy: No  Start date/time: Tomorrow    - Transfer to floor.  - Remove drain today.  - Keep incision open to air.  Okay to wash incision postop day 2, apply bacitracin twice daily for 7 days.  -Continue home Keppra  -Dexamethasone 4 mg every 6 hours  -Will arrange multidisciplinary clinic follow-up within a month and repeat MRI  - PT/OT/OOB    Please call with questions.    Henna Johnson M.D.

## 2025-07-09 NOTE — ASSESSMENT & PLAN NOTE
Status post resection by Dr. Kim 7/8 await pathology  Wound care  PT/OT to assess safety and mobility  Originally had a 2020 craniotomy which was diagnosed with oligodendroglioma.  Will need outpatient follow up with neurosurgery and oncology for decision of chemo/radiation vs watching.  Seizure precautions  Decadron and seizure prophylaxis

## 2025-07-09 NOTE — THERAPY
Physical Therapy   Initial Evaluation     Patient Name:  Alexander Prasad  Age:  49 y.o., Sex:  male  Medical Record #:  [id number]  Today's Date: 7/9/2025     Precautions  Medical: (P) Fall Risk, Blood Pressure Parameters (see comments)  Comments: (P) SBP <160    Assessment  Patient is 49 y.o. male with scheduled stealth right frontal craniotomy for tumor resection 7/8/25. 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 Pt reports he lives alone, and camps in [place], though utilizes the facilities at his great grandma's home for bathing and cooking. He reports use of SPC, and otherwise independent with self care and ADLs, though notes some limitations due to forgetfulness. Pt was able to participate with bed mobility, tranfers, and amb with FWW today with SBA. He will continue to benefit from skilled PT while he remains in the acute setting to address his deficits. Recommend further PT in the home health vs outpatient settings depending on progress with mobility.      Plan    Physical Therapy Initial Treatment Plan   Treatment Plan : (P) Bed Mobility, Gait Training, Neuro Re-Education / Balance, Stair Training, Therapeutic Activities, Therapeutic Exercise  Treatment Frequency: (P) 4 Times per Week  Duration: (P) Until Therapy Goals Met    DC Equipment Recommendations: (P) Unable to determine at this time, Front-Wheel Walker (consider FWW)  Discharge Recommendations: (P) Other - (HHPT vs outpatient depending on progress)       Subjective    Pt stating it feels good to get out of bed.     Objective       07/09/25 1447   Precautions   Medical Fall Risk;Blood Pressure Parameters (see comments)   Comments SBP <160   Vitals   Pulse 87   Blood Pressure 123/74   Pulse Oximetry 94 %   O2 (LPM) 0   O2 Delivery Device Room air w/o2 available   Vitals Comments pre-activty above; post activity vitals similar to above   Pain 0 - 10 Group   Therapist Pain Assessment   (no specific pain reported)   Prior Living Situation    Prior Services Home-Independent   Housing / Facility Homeless   Equipment Owned Single Point Cane   Lives with - Patient's Self Care Capacity Alone and Able to Care For Self   Comments reports he camps, but uses facilities for cooking and bathing at his great grandma's home; he is hoping to be able to stay there a while during recovery   Prior Level of Functional Mobility   Bed Mobility Independent   Transfer Status Independent   Ambulation Independent   Ambulation Distance community   Assistive Devices Used Single Point Cane   Stairs Independent   History of Falls   History of Falls   (unknown)   Cognition    Cognition / Consciousness WDL   Comments pleasant, cooperative; reports he is forgetful at baseline   Strength Lower Body   Lower Body Strength  WDL   Comments 5/5 grossly bilat LEs   Sensation Lower Body   Lower Extremity Sensation   WDL   Comments denies N/T   Lower Body Muscle Tone   Lower Body Muscle Tone  WDL   Coordination Lower Body    Coordination Lower Body  WDL   Balance Assessment   Sitting Balance (Static) Fair +   Sitting Balance (Dynamic) Fair +   Standing Balance (Static) Fair   Standing Balance (Dynamic) Fair   Weight Shift Sitting Fair   Weight Shift Standing Fair   Comments with FWW   Bed Mobility    Supine to Sit Standby Assist   Scooting Standby Assist   Gait Analysis   Gait Level Of Assist Standby Assist   Assistive Device Front Wheel Walker   Distance (Feet) 35   # of Times Distance was Traveled 1   Deviation Decreased Heel Strike;Decreased Toe Off   # of Stairs Climbed 0   Weight Bearing Status no restriction   Comments limited by fatigue   Functional Mobility   Sit to Stand Contact Guard Assist   Bed, Chair, Wheelchair Transfer Contact Guard Assist   Transfer Method Stand Step   Mobility supine->sit EOB->Stand->amb->chair   Comments no LOB   ICU Target Mobility Level   ICU Mobility - Targeted Level Level 4   6 Clicks Assessment - How much HELP from from another person do you currently  need... (If the patient hasn't done an activity recently, how much help from another person do you think he/she would need if he/she tried?)   Turning from your back to your side while in a flat bed without using bedrails? 4   Moving from lying on your back to sitting on the side of a flat bed without using bedrails? 4   Moving to and from a bed to a chair (including a wheelchair)? 3   Standing up from a chair using your arms (e.g., wheelchair, or bedside chair)? 3   Walking in hospital room? 3   Climbing 3-5 steps with a railing? 3   6 clicks Mobility Score 20   Activity Tolerance   Sitting in Chair left up in chair   Sitting Edge of Bed 3 min approx   Standing 5 min approx   Comments limted by fatigue   Short Term Goals    Short Term Goal # 1 Pt will perform supine<->sit with supv within 6 visits in order to return home   Short Term Goal # 2 Pt will transfer bed<->chair with FWW or SPC and supv within 6 visits in order to return home   Short Term Goal # 3 Pt will qpt593' with FWW or SPC and supv within 6 visits in order to return home   Short Term Goal # 4 Pt will perform 2 stairs with SBA within 6 visits as required to enter/exit home   Education Group   Education Provided Role of Physical Therapist   Role of Physical Therapist Patient Response Patient;Acceptance;Explanation;Verbal Demonstration   Physical Therapy Initial Treatment Plan    Treatment Plan  Bed Mobility;Gait Training;Neuro Re-Education / Balance;Stair Training;Therapeutic Activities;Therapeutic Exercise   Treatment Frequency 4 Times per Week   Duration Until Therapy Goals Met   Problem List    Problems Pain;Impaired Bed Mobility;Impaired Transfers;Impaired Ambulation;Functional Strength Deficit;Impaired Balance;Decreased Activity Tolerance   Anticipated Discharge Equipment and Recommendations   DC Equipment Recommendations Unable to determine at this time;Front-Wheel Walker  (consider FWW)   Discharge Recommendations Other -  (HHPT vs outpatient  depending on progress)

## 2025-07-09 NOTE — CONSULTS
Hospital Medicine Consultation    Date of Service  7/9/2025    Referring Physician  Aman Deng M.D.    Consulting Physician  Elie Mccormick D.O.    Reason for Consultation  Transfer of care out of ICU    History of Presenting Illness  Alexander Prasad is a 49 y.o. 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  He presented 7/8/2025 with recurrence of right frontal tumor and was admitted for resection.  On 7/8 Dr Henna Johnson performed a stealth right frontal craniotomy.  There was concern of diaphrosis and EKG abnormality.  While asleep he has bradycardia but awakens with improvement in heart rate.  Per nursing he was drenched pale slightly and had stable glucose level and blood pressure.  Nursing noted that he had a heart rate of 30 while asleep and becomes hypoxic requiring 5 L while asleep    Postsurgery the patient is alert and oriented.  Denies any headache, blurred vision nor any neurologic deficit.  The patient states no palpable masses in the neck armpits breast or testicles.  He states he has not been have any abnormal weight loss.  He has never had a polysomnogram test which he states he is supposed to have outpatient.  Patient was incarcerated at 1 point in time but is never spent any time in any [place] and denies any knowledge of having exposure to tuberculosis.  Patient states he has had episodes of sweating profusely over the last several years and he has not thought much of it.    Review of Systems  Review of Systems   Constitutional:  Positive for diaphoresis. Negative for chills and fever.   HENT:  Negative for congestion.    Eyes:  Negative for blurred vision and double vision.   Respiratory:  Negative for shortness of breath.    Cardiovascular:  Negative for chest pain, palpitations and leg swelling.   Gastrointestinal:  Negative for abdominal pain and nausea.   Genitourinary:   Negative for dysuria.   Neurological:  Negative for sensory change, speech change, focal weakness and headaches.   Psychiatric/Behavioral:  The patient is not nervous/anxious.        Past Medical History   has a past medical history of Blood clotting disorder (HCC) (2022), Breath shortness (06/27/2025), Dental disorder (06/27/2025), Imprisonment and other incarceration (05/18/2020), Psychiatric problem (06/27/2025), and Seizure (HCC) (06/27/2025).    Surgical History   has a past surgical history that includes craniotomy tumor (2020) and craniotomy stealth (Right, 7/8/2025).    Family History  Family History   Problem Relation Age of Onset    Cancer Mother         oligodendrioma    Hypertension Father     Heart Disease Father         Age 27    Cancer Maternal Grandmother         ?lung    Heart Disease Paternal Grandmother     No Known Problems Son        Social History   reports that he has been smoking cigarettes. He started smoking about 20 years ago. He has a 10.3 pack-year smoking history. He has been exposed to tobacco smoke. He has never used smokeless tobacco. He reports that he does not currently use alcohol. He reports that he does not currently use drugs after having used the following drugs: Methamphetamines.    Medications  Current Medications[1]    Allergies  Allergies[2]    Physical Exam  Temp:  [35.5 °C (95.9 °F)-36.9 °C (98.4 °F)] 35.6 °C (96 °F)  Pulse:  [] 58  Resp:  [8-61] 11  BP: ()/(54-86) 124/61  SpO2:  [91 %-97 %] 93 %    Physical Exam  Vitals reviewed.   Constitutional:       Appearance: He is obese. He is not ill-appearing.   HENT:      Head:      Comments: Craniotomy scar at scalp line with staples intact no signs of dehiscence nor drainage.     Nose: Nose normal.      Mouth/Throat:      Mouth: Mucous membranes are moist.      Comments: Poor dentition with many teeth missing  Eyes:      General:         Right eye: No discharge.         Left eye: No discharge.       Conjunctiva/sclera: Conjunctivae normal.   Cardiovascular:      Rate and Rhythm: Normal rate and regular rhythm.      Heart sounds: No murmur heard.  Pulmonary:      Effort: Pulmonary effort is normal. No respiratory distress.      Breath sounds: Normal breath sounds.   Abdominal:      General: Bowel sounds are normal. There is no distension.      Palpations: Abdomen is soft.   Musculoskeletal:      Cervical back: Neck supple.      Right lower leg: No edema.      Left lower leg: No edema.   Lymphadenopathy:      Cervical: No cervical adenopathy.   Skin:     General: Skin is dry.   Neurological:      General: No focal deficit present.      Mental Status: He is oriented to person, place, and time.      Cranial Nerves: No cranial nerve deficit.   Psychiatric:         Mood and Affect: Mood normal.         Behavior: Behavior normal.         Thought Content: Thought content normal.         Fluids  Date 07/09/25 0700 - 07/10/25 0659   Shift 6406-4888 7562-6083 6224-2456 24 Hour Total   INTAKE   I.V. 261.8   261.8   Shift Total 261.8   261.8   OUTPUT   Drains 100   100   Shift Total 100   100   Weight (kg) 148.9 148.9 148.9 148.9       Laboratory  Recent Labs     07/08/25  1430 07/09/25  0423   WBC 15.0* 23.1*   RBC 5.19 4.73   HEMOGLOBIN 14.7 13.8*   HEMATOCRIT 44.8 40.9*   MCV 86.3 86.5   MCH 28.3 29.2   MCHC 32.8 33.7   RDW 40.7 41.1   PLATELETCT 299 318   MPV 8.3* 8.4*     Recent Labs     07/08/25  1430 07/09/25  0423   SODIUM 136 137   POTASSIUM 4.9 4.2   CHLORIDE 107 107   CO2 18* 17*   GLUCOSE 185* 164*   BUN 17 16   CREATININE 1.02 1.06   CALCIUM 8.8 8.3*                     Imaging  CT-HEAD W/O   Final Result      1.  Interval right frontal craniotomy with moderate sized irregular postsurgical defect in the right anterior frontal lobe.   2.  Small amount of increased attenuation about the periphery of the resection cavity consistent with a small amount of hemorrhage.   3.  Nondependent pneumocephalus in the right  frontal region and postoperative cavity.                  MR-STEALTH BRAIN WITH & W/O   Final Result    Limited study   When compared with the previous MRI dated 4/25/2025 again noted is abnormal right frontal T2 hyperintense mass. There has been interval increase in the size of the enhancing area in the right frontal lesion. There is also mild overall interval increase    in the size of the lesion.          Assessment/Plan  * Brain mass- (present on admission)  Assessment & Plan  Status post resection by Dr. Kim 7/8 await pathology  Wound care  PT/OT to assess safety and mobility  Originally had a 2020 craniotomy which was diagnosed with oligodendroglioma.  Seizure precautions  Decadron and seizure prophylaxis    Morbid obesity (HCC)  Assessment & Plan  Body mass index is 45.8 kg/m².  Encouraged lifestyle modification, caloric restriction, increase activity in the near future.  Follow-up with primary for potential bariatric and lifestyle counseling  Needs an outpatient sleep study    Diaphoresis  Assessment & Plan  Unclear etiology as the patient has been normoglycemic and had stable blood pressures.  Question of this is secondary to Decadron but he states that he has had this prior to this hospitalization.  Patient is morbidly obese which may be contributing.  Hyperhidrosis however he states it is more generalized than just in the palms of his hands.  Continue to monitor  Check TSH    Sinus bradycardia  Assessment & Plan  Occurs while asleep per ICU nurse heart rate was 30 at 1 point in time but otherwise in the 30s to 40s while asleep.  Probable NEEMA.  I will check an echocardiogram  Currently on my evaluation heart rates were in the upper 80s to low 90s.  Check TSH electrolytes appear to be within normal limits.    Seizure (HCC)  Assessment & Plan  Question of this is secondary to his brain tumor  Outpatient he was previously on Keppra                 [1]   Current Facility-Administered Medications    Medication Dose Route Frequency Provider Last Rate Last Admin    levETIRAcetam (Keppra) tablet 500 mg  500 mg Oral BID Natacha Ann A.P.N.   500 mg at 07/09/25 0539    MD ALERT...DO NOT ADMINISTER NSAIDS or ASPIRIN unless ORDERED By Neurosurgery 1 Each  1 Each Other PRN Natacha Ann A.P.N.        ondansetron (Zofran) syringe/vial injection 4 mg  4 mg Intravenous Q4HRS PRN Natacha Ann A.P.N.        docusate sodium (Colace) capsule 100 mg  100 mg Oral BID Natacha Ann A.P.N.   100 mg at 07/09/25 0539    senna-docusate (Pericolace Or Senokot S) 8.6-50 MG per tablet 1 Tablet  1 Tablet Oral Nightly Natacha Ann A.P.N.   1 Tablet at 07/08/25 2012    senna-docusate (Pericolace Or Senokot S) 8.6-50 MG per tablet 1 Tablet  1 Tablet Oral Q24HRS PRN Natacha Ann A.P.N.        polyethylene glycol/lytes (Miralax) Packet 1 Packet  1 Packet Oral BID PRN Natacha Ann A.P.N.        magnesium hydroxide (Milk Of Magnesia) suspension 30 mL  30 mL Oral QDAY PRN Natacha Ann A.P.N.        bisacodyl (Dulcolax) suppository 10 mg  10 mg Rectal Q24HRS PRN Natacha Ann A.P.N.        sodium phosphate enema 1 Each  1 Each Rectal Once PRN Natacha Ann A.P.N.        acetaminophen (Tylenol) tablet 1,000 mg  1,000 mg Oral Q6HRS Natacha Ann, A.P.N.   1,000 mg at 07/09/25 1141    Followed by    [START ON 7/13/2025] acetaminophen (Tylenol) tablet 1,000 mg  1,000 mg Oral Q6HRS PRN Natacha Ann A.P.N.        oxyCODONE immediate-release (Roxicodone) tablet 5 mg  5 mg Oral Q3HRS PRN Natacha Ann, A.P.N.        Or    oxyCODONE immediate release (Roxicodone) tablet 10 mg  10 mg Oral Q3HRS PRN Natacha Ann, A.P.N.   10 mg at 07/09/25 0540    Or    HYDROmorphone (Dilaudid) injection 0.5 mg  0.5 mg Intravenous Q3HRS PRN Natacha Ann, A.P.N.   0.5 mg at  07/09/25 0737    dexamethasone (Decadron) injection 4 mg  4 mg Intravenous Q6HRS Natacha Ann, A.P.N.   4 mg at 07/09/25 1141    MD Alert...ICU Electrolyte Replacement per Pharmacy   Other PHARMACY TO DOSE Aman Deng M.D.        labetalol (Normodyne/Trandate) injection 10 mg  10 mg Intravenous Q HOUR PRN Aman Deng M.D.        hydrALAZINE (Apresoline) injection 10 mg  10 mg Intravenous Q HOUR PRN Aman Deng M.D.       [2] No Active Allergies

## 2025-07-09 NOTE — DIETARY
NUTRITION SERVICES: BMI - Pt with BMI >40 (=Body mass index is 45.8 kg/m².), class III obesity. Weight is taken via stand up scale and pt is +1L fluids per I/O. Weight loss counseling not appropriate in acute care setting.     RECOMMEND - If appropriate at DC please refer to outpatient nutrition services for weight management.

## 2025-07-09 NOTE — HOSPITAL COURSE
7/8 - admitted to the ICU postoperatively.  ECG changes and diaphoresis were evaluated with negative cardiac workup.  Gradual improvement as the day went on.    7/9 - Better today, somnolent but wakes up to voice and participates appropriately in conversation.  Neuro intact.  He states that he does sweat profusely at home periodically.  He was diaphoretic again this morning but is currently improved and eating breakfast.  Ok for floor, BELGICA will be removed today.

## 2025-07-09 NOTE — DISCHARGE PLANNING
Case Management Discharge Planning    Admission Date: 7/8/2025  GMLOS: 1.6  ALOS: 1    6-Clicks ADL Score: 24  6-Clicks Mobility Score: 24      Anticipated Discharge Dispo: Discharge Disposition: Discharged to home/self care (01)  Discharge Contact Phone Number: [phone]    DME Needed: No    Action(s) Taken: DC Assessment Complete (See below)    Escalations Completed: None    Medically Clear: No    Next Steps: RNCM to follow for DC needs    Barriers to Discharge: Medical clearance and Homelessness    Is the patient up for discharge tomorrow: No       Care Transition Team Assessment    Patient is a 49 year-old male admitted for Brain Neoplasm, Malignant. Please see pt's H&P for prior medical history. RNCM met with pt at bedside to complete assessment. Pt A&Ox4 and able to verify the information on the face sheet. Pt is unhoused. Emergency contact is Aunt CASTANON Mary Ann Aravind, [phone]. Prior to admission patient is independent with ADL's and IADL’s. Aunt would provide transportation. Pt reports, pt is not employed, with no income. The patient's PCP is Dr. Marylin Del Toro. Patient's preferred pharmacy is [facility], [place]. Patient denies a history of SNF/IPR nor HHC use in the past. Patients confirmed medical coverage with Medicaid FFS.  Patient has means to transportation and Aunt, Mary Ann Nazario, will provide transport once medically stable for discharge. RNCM discussed discharge planning. Patient reported pt's goal is to return home once medically stable.             Information Source  Orientation Level: Oriented X4  Information Given By: Patient  Informant's Name: Alexander Prasad  Who is responsible for making decisions for patient? : Patient    Readmission Evaluation  Is this a readmission?: No    Elopement Risk  Legal Hold: No  Ambulatory or Self Mobile in Wheelchair: No-Not an Elopement Risk  Elopement Risk: Not at Risk for Elopement    Interdisciplinary Discharge Planning  Patient or legal guardian wants  to designate a caregiver: No    Discharge Preparedness  What is your plan after discharge?: Home with help  What are your discharge supports?: Other (comment) (Aunt, Mary Ann Nazario, [phone])  Prior Functional Level: Ambulatory, Independent with Activities of Daily Living, Independent with Medication Management  Difficulity with ADLs: None  Difficulity with IADLs: Driving  Difficulity with IADL Comments: Aunt would provide transportation    Functional Assesment  Prior Functional Level: Ambulatory, Independent with Activities of Daily Living, Independent with Medication Management    Finances  Financial Barriers to Discharge: Yes  Average Monthly Income: 0 $  Source of Income: None    Vision / Hearing Impairment  Right Eye Vision: Impaired, Wears Contacts  Left Eye Vision: Impaired, Wears Contacts         Advance Directive  Advance Directive?: None  Advance Directive offered?: AD Booklet given    Domestic Abuse  Physical Abuse or Sexual Abuse: No  Verbal Abuse or Emotional Abuse: No         Discharge Risks or Barriers  Discharge risks or barriers?: Homeless / couch surfing, Complex medical needs  Patient risk factors: Homeless    Anticipated Discharge Information  Discharge Disposition: Discharged to home/self care (01)  Discharge Contact Phone Number: [phone]

## 2025-07-09 NOTE — PROGRESS NOTES
Critical Care Progress Note    Date of admission  7/8/2025    Chief Complaint  49 y.o. male with a history of seizures and right frontal mass.  He had a prior craniotomy and resection in 2020 with path indicative of grade 2 oligodendroglioma.  He was not treated with chemotherapy at that time.  He has since moved to the area and reestablished care with a neurosurgeon after he began developing worsening symptoms and was found to have right frontal tumor.  On 7/8 he was admitted to the hospital for resection and was admitted to the ICU postoperatively.     Hospital Course  7/8 - admitted to the ICU postoperatively.  ECG changes and diaphoresis were evaluated with negative cardiac workup.  Gradual improvement as the day went on.    7/9 - Better today, somnolent but wakes up to voice and participates appropriately in conversation.  Neuro intact.  He states that he does sweat profusely at home periodically.  He was diaphoretic again this morning but is currently improved and eating breakfast.  Ok for floor, BELGICA will be removed today.    Interval Problem Update  Reviewed last 24 hour events:  Tmax: Afebrile  Diet: Regular Diet    Vasopressors: non    Infusions: None     Antibx: None    Intake / Output  Urine Output past 24 hours: 875mL    Lab Trends  WBC 15 --> 23    CO2 18 --> 17     Troponin <6 x3 checks    Review of Systems  Review of Systems   Unable to perform ROS: Medical condition        Vital Signs for last 24 hours   Temp:  [35.5 °C (95.9 °F)-36.9 °C (98.4 °F)] 35.6 °C (96 °F)  Pulse:  [] 46  Resp:  [8-61] 12  BP: ()/(54-86) 108/62  SpO2:  [91 %-97 %] 95 %    Hemodynamic parameters for last 24 hours       Respiratory Information for the last 24 hours       Physical Exam   Physical Exam  Vitals and nursing note reviewed. Exam conducted with a chaperone present.   Constitutional:       General: He is sleeping. He is not in acute distress.     Appearance: Normal appearance. He is not ill-appearing.    HENT:      Head: Normocephalic.      Comments: Incision is c/d/I  BELGICA from left front     Mouth/Throat:      Mouth: Mucous membranes are moist.   Eyes:      Extraocular Movements: Extraocular movements intact.   Cardiovascular:      Rate and Rhythm: Normal rate and regular rhythm.      Pulses: Normal pulses.   Pulmonary:      Effort: Pulmonary effort is normal. No respiratory distress.   Abdominal:      General: There is no distension.      Palpations: Abdomen is soft.      Tenderness: There is no abdominal tenderness. There is no guarding or rebound.   Musculoskeletal:         General: Normal range of motion.      Cervical back: Normal range of motion and neck supple.   Skin:     General: Skin is warm and dry.      Capillary Refill: Capillary refill takes less than 2 seconds.   Neurological:      Mental Status: He is easily aroused.      GCS: GCS eye subscore is 3. GCS verbal subscore is 5. GCS motor subscore is 6.      Sensory: Sensation is intact.      Motor: Motor function is intact.      Coordination: Coordination is intact.      Comments: No focal deficits, follows commands    Sleep but arouses to voice easily         Medications  Current Medications[1]    Fluids    Intake/Output Summary (Last 24 hours) at 7/9/2025 0841  Last data filed at 7/9/2025 0800  Gross per 24 hour   Intake 3269.97 ml   Output 2220 ml   Net 1049.97 ml       Laboratory          Recent Labs     07/08/25  1430 07/09/25  0423   SODIUM 136 137   POTASSIUM 4.9 4.2   CHLORIDE 107 107   CO2 18* 17*   BUN 17 16   CREATININE 1.02 1.06   MAGNESIUM 2.4 2.0   PHOSPHORUS 3.0 2.5   CALCIUM 8.8 8.3*     Recent Labs     07/08/25  1430 07/09/25  0423   GLUCOSE 185* 164*     Recent Labs     07/08/25  1430 07/09/25  0423   WBC 15.0* 23.1*     Recent Labs     07/08/25  1430 07/09/25  0423   RBC 5.19 4.73   HEMOGLOBIN 14.7 13.8*   HEMATOCRIT 44.8 40.9*   PLATELETCT 299 318       Imaging  No imaging this morning    Assessment/Plan  * Brain mass- (present on  admission)  Assessment & Plan  Surgical resection of right frontal mass with Dr Johnson on 7/8  Previously resected grade 2 oligodendroglioma in 2020    Admit to ICU  Q1 neuro checks for now  Continue Keppra for seizures  Pain control  Decadron per nsgy    SBP <160  - I am actively titrating a nicardipine infusion for SBP <160  - PRNs available    Monitor drain output    Diaphoresis  Assessment & Plan  Improving  Cardiac workup negative  Continue to monitor    For what it's worth, he does say that this is fairly typical for him    Sinus bradycardia  Assessment & Plan  Unclear baseline  No hemodynamic effects  ECG without a block  Monitor for now    He improves to 70s when he wakes up and talks    Seizure (HCC)  Assessment & Plan  Continue keppra         VTE:  Per neurosurgery  Ulcer: Not Indicated  Lines: Left frontal BELGICA - nsgy planning on removing today    I have performed a physical exam and reviewed and updated ROS and Plan today (7/9/2025). In review of yesterday's note (7/8/2025), there are no changes except as documented above.     Discussed patient condition and risk of morbidity and/or mortality with RN, RT, Pharmacy, , Charge nurse / hot rounds, Patient, and neurosurgery    Ok to transfer patient out of ICU today. [facility] will sign off at transfer. Please call with questions.        [1]   Current Facility-Administered Medications   Medication Dose Route Frequency Provider Last Rate Last Admin    levETIRAcetam (Keppra) tablet 500 mg  500 mg Oral BID Natacha Ann, A.P.N.   500 mg at 07/09/25 0539    MD ALERT...DO NOT ADMINISTER NSAIDS or ASPIRIN unless ORDERED By Neurosurgery 1 Each  1 Each Other PRN Natacha Ann A.P.N.        ondansetron (Zofran) syringe/vial injection 4 mg  4 mg Intravenous Q4HRS PRN Natacha Ann A.P.N.        docusate sodium (Colace) capsule 100 mg  100 mg Oral BID Natacha Ann, A.P.N.   100 mg at 07/09/25 0539     senna-docusate (Pericolace Or Senokot S) 8.6-50 MG per tablet 1 Tablet  1 Tablet Oral Nightly Natacha Ann, A.P.N.   1 Tablet at 07/08/25 2012    senna-docusate (Pericolace Or Senokot S) 8.6-50 MG per tablet 1 Tablet  1 Tablet Oral Q24HRS PRN Natacha Ann, A.P.N.        polyethylene glycol/lytes (Miralax) Packet 1 Packet  1 Packet Oral BID PRN Natacha Ann, A.P.N.        magnesium hydroxide (Milk Of Magnesia) suspension 30 mL  30 mL Oral QDAY PRN Natacha Ann, A.P.N.        bisacodyl (Dulcolax) suppository 10 mg  10 mg Rectal Q24HRS PRN Natacha Ann, A.P.N.        sodium phosphate enema 1 Each  1 Each Rectal Once PRN Natacha Ann, A.P.N.        artificial tears (Eye Lubricant) ophth ointment 1 Application  1 Application Both Eyes Q8HRS Natacha Ann, A.P.N.   1 Application at 07/08/25 1334    NS infusion   Intravenous Continuous Natacha Ann, A.P.N. 100 mL/hr at 07/08/25 2303 New Bag at 07/08/25 2303    acetaminophen (Tylenol) tablet 1,000 mg  1,000 mg Oral Q6HRS Natacha Ann, A.P.N.   1,000 mg at 07/09/25 0539    Followed by    [START ON 7/13/2025] acetaminophen (Tylenol) tablet 1,000 mg  1,000 mg Oral Q6HRS PRN Natacha Ann, A.P.N.        oxyCODONE immediate-release (Roxicodone) tablet 5 mg  5 mg Oral Q3HRS PRN Natacha Ann, A.P.N.        Or    oxyCODONE immediate release (Roxicodone) tablet 10 mg  10 mg Oral Q3HRS PRN Natacha Ann, A.P.N.   10 mg at 07/09/25 0540    Or    HYDROmorphone (Dilaudid) injection 0.5 mg  0.5 mg Intravenous Q3HRS PRN Natacha Ann, A.P.N.   0.5 mg at 07/09/25 0737    dexamethasone (Decadron) injection 4 mg  4 mg Intravenous Q6HRS Natacha Ann, A.P.N.   4 mg at 07/09/25 0539    MD Alert...ICU Electrolyte Replacement per Pharmacy   Other PHARMACY TO DOSE Aman Deng M.D.        labetalol  (Normodyne/Trandate) injection 10 mg  10 mg Intravenous Q HOUR PRN Aman Deng M.D.        hydrALAZINE (Apresoline) injection 10 mg  10 mg Intravenous Q HOUR PRN Aman Deng M.D.

## 2025-07-09 NOTE — CARE PLAN
The patient is Watcher - Medium risk of patient condition declining or worsening    Shift Goals  Clinical Goals: SBP<140, Q1 Neuro  Patient Goals: rest  Family Goals: CYNDY    Progress made toward(s) clinical / shift goals:      Problem: Knowledge Deficit - Standard  Goal: Patient and family/care givers will demonstrate understanding of plan of care, disease process/condition, diagnostic tests and medications  Outcome: Progressing     Problem: Skin Integrity  Goal: Skin integrity is maintained or improved  Outcome: Progressing     Problem: Fall Risk  Goal: Patient will remain free from falls  Outcome: Progressing     Problem: Pain - Standard  Goal: Alleviation of pain or a reduction in pain to the patient’s comfort goal  Outcome: Progressing       Patient is not progressing towards the following goals:

## 2025-07-09 NOTE — ASSESSMENT & PLAN NOTE
Occurs while asleep per ICU nurse heart rate was 30 at one point in time but otherwise in the 30s to 40s while asleep.  Probable component of NEEMA.  Monitor on tele  TSH normal  Echo normal

## 2025-07-09 NOTE — ASSESSMENT & PLAN NOTE
Unclear etiology as the patient has been normoglycemic and had stable blood pressures.  Question of this is secondary to Decadron but he states that he has had this prior to this hospitalization.  Patient is morbidly obese which may be contributing.  Hyperhidrosis however he states it is more generalized than just in the palms of his hands.  Continue to monitor  Check TSH

## 2025-07-10 ENCOUNTER — PHARMACY VISIT (OUTPATIENT)
Dept: PHARMACY | Facility: MEDICAL CENTER | Age: 50
End: 2025-07-10
Payer: COMMERCIAL

## 2025-07-10 VITALS
RESPIRATION RATE: 12 BRPM | OXYGEN SATURATION: 95 % | DIASTOLIC BLOOD PRESSURE: 73 MMHG | TEMPERATURE: 98.6 F | BODY MASS INDEX: 43.52 KG/M2 | SYSTOLIC BLOOD PRESSURE: 133 MMHG | HEART RATE: 53 BPM | HEIGHT: 71 IN | WEIGHT: 310.85 LBS

## 2025-07-10 PROBLEM — I10 HYPERTENSION: Status: ACTIVE | Noted: 2025-07-10

## 2025-07-10 LAB
ANION GAP SERPL CALC-SCNC: 13 MMOL/L (ref 7–16)
BUN SERPL-MCNC: 19 MG/DL (ref 8–22)
CALCIUM SERPL-MCNC: 8.6 MG/DL (ref 8.5–10.5)
CHLORIDE SERPL-SCNC: 107 MMOL/L (ref 96–112)
CO2 SERPL-SCNC: 19 MMOL/L (ref 20–33)
CORTIS SERPL-MCNC: 0.6 UG/DL (ref 0–23)
CREAT SERPL-MCNC: 0.97 MG/DL (ref 0.5–1.4)
ERYTHROCYTE [DISTWIDTH] IN BLOOD BY AUTOMATED COUNT: 42.9 FL (ref 35.9–50)
GAMMA INTERFERON BACKGROUND BLD IA-ACNC: 0.02 IU/ML
GFR SERPLBLD CREATININE-BSD FMLA CKD-EPI: 96 ML/MIN/1.73 M 2
GLUCOSE SERPL-MCNC: 136 MG/DL (ref 65–99)
HCT VFR BLD AUTO: 38.8 % (ref 42–52)
HGB BLD-MCNC: 12.9 G/DL (ref 14–18)
M TB IFN-G BLD-IMP: NEGATIVE
M TB IFN-G CD4+ BCKGRND COR BLD-ACNC: 0 IU/ML
MAGNESIUM SERPL-MCNC: 2.3 MG/DL (ref 1.5–2.5)
MCH RBC QN AUTO: 29.1 PG (ref 27–33)
MCHC RBC AUTO-ENTMCNC: 33.2 G/DL (ref 32.3–36.5)
MCV RBC AUTO: 87.6 FL (ref 81.4–97.8)
MITOGEN IGNF BCKGRD COR BLD-ACNC: 4.49 IU/ML
PHOSPHATE SERPL-MCNC: 2.6 MG/DL (ref 2.5–4.5)
PLATELET # BLD AUTO: 298 K/UL (ref 164–446)
PMV BLD AUTO: 8.7 FL (ref 9–12.9)
POTASSIUM SERPL-SCNC: 4.4 MMOL/L (ref 3.6–5.5)
QFT TB2 - NIL TBQ2: 0 IU/ML
RBC # BLD AUTO: 4.43 M/UL (ref 4.7–6.1)
SODIUM SERPL-SCNC: 139 MMOL/L (ref 135–145)
WBC # BLD AUTO: 23.3 K/UL (ref 4.8–10.8)

## 2025-07-10 PROCEDURE — 83735 ASSAY OF MAGNESIUM: CPT

## 2025-07-10 PROCEDURE — 80048 BASIC METABOLIC PNL TOTAL CA: CPT

## 2025-07-10 PROCEDURE — 700102 HCHG RX REV CODE 250 W/ 637 OVERRIDE(OP): Performed by: NURSE PRACTITIONER

## 2025-07-10 PROCEDURE — 82533 TOTAL CORTISOL: CPT

## 2025-07-10 PROCEDURE — 99239 HOSP IP/OBS DSCHRG MGMT >30: CPT | Performed by: HOSPITALIST

## 2025-07-10 PROCEDURE — 85027 COMPLETE CBC AUTOMATED: CPT

## 2025-07-10 PROCEDURE — A9270 NON-COVERED ITEM OR SERVICE: HCPCS | Performed by: NURSE PRACTITIONER

## 2025-07-10 PROCEDURE — 84100 ASSAY OF PHOSPHORUS: CPT

## 2025-07-10 PROCEDURE — 700111 HCHG RX REV CODE 636 W/ 250 OVERRIDE (IP): Performed by: NURSE PRACTITIONER

## 2025-07-10 PROCEDURE — 700111 HCHG RX REV CODE 636 W/ 250 OVERRIDE (IP): Mod: JZ | Performed by: STUDENT IN AN ORGANIZED HEALTH CARE EDUCATION/TRAINING PROGRAM

## 2025-07-10 PROCEDURE — RXMED WILLOW AMBULATORY MEDICATION CHARGE: Performed by: HOSPITALIST

## 2025-07-10 PROCEDURE — 700102 HCHG RX REV CODE 250 W/ 637 OVERRIDE(OP): Performed by: HOSPITALIST

## 2025-07-10 PROCEDURE — A9270 NON-COVERED ITEM OR SERVICE: HCPCS | Performed by: HOSPITALIST

## 2025-07-10 RX ORDER — AMLODIPINE BESYLATE 10 MG/1
10 TABLET ORAL
Status: DISCONTINUED | OUTPATIENT
Start: 2025-07-10 | End: 2025-07-10 | Stop reason: HOSPADM

## 2025-07-10 RX ORDER — DEXAMETHASONE 4 MG/1
4 TABLET ORAL EVERY 8 HOURS
Status: DISCONTINUED | OUTPATIENT
Start: 2025-07-10 | End: 2025-07-10 | Stop reason: HOSPADM

## 2025-07-10 RX ORDER — METHYLPREDNISOLONE 4 MG/1
TABLET ORAL
Qty: 21 TABLET | Refills: 0 | Status: SHIPPED | OUTPATIENT
Start: 2025-07-10

## 2025-07-10 RX ORDER — AMLODIPINE BESYLATE 10 MG/1
10 TABLET ORAL DAILY
Qty: 100 TABLET | Refills: 3 | Status: SHIPPED | OUTPATIENT
Start: 2025-07-10 | End: 2026-08-14

## 2025-07-10 RX ORDER — NICOTINE 21 MG/24HR
21 PATCH, TRANSDERMAL 24 HOURS TRANSDERMAL
Status: DISCONTINUED | OUTPATIENT
Start: 2025-07-10 | End: 2025-07-10 | Stop reason: HOSPADM

## 2025-07-10 RX ADMIN — NICOTINE 21 MG: 21 PATCH TRANSDERMAL at 09:00

## 2025-07-10 RX ADMIN — ACETAMINOPHEN 1000 MG: 500 TABLET ORAL at 00:15

## 2025-07-10 RX ADMIN — OXYCODONE 5 MG: 5 TABLET ORAL at 04:06

## 2025-07-10 RX ADMIN — ACETAMINOPHEN 1000 MG: 500 TABLET ORAL at 05:52

## 2025-07-10 RX ADMIN — DEXAMETHASONE SODIUM PHOSPHATE 4 MG: 4 INJECTION, SOLUTION INTRA-ARTICULAR; INTRALESIONAL; INTRAMUSCULAR; INTRAVENOUS; SOFT TISSUE at 05:52

## 2025-07-10 RX ADMIN — LEVETIRACETAM 500 MG: 500 TABLET, FILM COATED ORAL at 05:52

## 2025-07-10 RX ADMIN — HYDRALAZINE HYDROCHLORIDE 10 MG: 20 INJECTION INTRAMUSCULAR; INTRAVENOUS at 07:22

## 2025-07-10 RX ADMIN — DOCUSATE SODIUM 100 MG: 100 CAPSULE, LIQUID FILLED ORAL at 05:52

## 2025-07-10 RX ADMIN — DEXAMETHASONE SODIUM PHOSPHATE 4 MG: 4 INJECTION, SOLUTION INTRA-ARTICULAR; INTRALESIONAL; INTRAMUSCULAR; INTRAVENOUS; SOFT TISSUE at 00:15

## 2025-07-10 RX ADMIN — OXYCODONE HYDROCHLORIDE 10 MG: 10 TABLET ORAL at 00:21

## 2025-07-10 RX ADMIN — DEXAMETHASONE 4 MG: 4 TABLET ORAL at 09:21

## 2025-07-10 RX ADMIN — AMLODIPINE BESYLATE 10 MG: 10 TABLET ORAL at 09:12

## 2025-07-10 SDOH — ECONOMIC STABILITY: TRANSPORTATION INSECURITY
IN THE PAST 12 MONTHS, HAS THE LACK OF TRANSPORTATION KEPT YOU FROM MEDICAL APPOINTMENTS OR FROM GETTING MEDICATIONS?: NO

## 2025-07-10 SDOH — ECONOMIC STABILITY: TRANSPORTATION INSECURITY
IN THE PAST 12 MONTHS, HAS LACK OF RELIABLE TRANSPORTATION KEPT YOU FROM MEDICAL APPOINTMENTS, MEETINGS, WORK OR FROM GETTING THINGS NEEDED FOR DAILY LIVING?: NO

## 2025-07-10 ASSESSMENT — PAIN DESCRIPTION - PAIN TYPE
TYPE: ACUTE PAIN

## 2025-07-10 ASSESSMENT — LIFESTYLE VARIABLES
AVERAGE NUMBER OF DAYS PER WEEK YOU HAVE A DRINK CONTAINING ALCOHOL: 0
HAVE PEOPLE ANNOYED YOU BY CRITICIZING YOUR DRINKING: NO
TOTAL SCORE: 0
HOW MANY TIMES IN THE PAST YEAR HAVE YOU HAD 5 OR MORE DRINKS IN A DAY: 0
DOES PATIENT WANT TO STOP DRINKING: NO
ALCOHOL_USE: NO
EVER FELT BAD OR GUILTY ABOUT YOUR DRINKING: NO
EVER HAD A DRINK FIRST THING IN THE MORNING TO STEADY YOUR NERVES TO GET RID OF A HANGOVER: NO
TOTAL SCORE: 0
ON A TYPICAL DAY WHEN YOU DRINK ALCOHOL HOW MANY DRINKS DO YOU HAVE: 0
CONSUMPTION TOTAL: NEGATIVE
TOTAL SCORE: 0
HAVE YOU EVER FELT YOU SHOULD CUT DOWN ON YOUR DRINKING: NO

## 2025-07-10 ASSESSMENT — SOCIAL DETERMINANTS OF HEALTH (SDOH)
IN THE PAST 12 MONTHS, HAS THE ELECTRIC, GAS, OIL, OR WATER COMPANY THREATENED TO SHUT OFF SERVICE IN YOUR HOME?: NO
WITHIN THE LAST YEAR, HAVE YOU BEEN HUMILIATED OR EMOTIONALLY ABUSED IN OTHER WAYS BY YOUR PARTNER OR EX-PARTNER?: NO
WITHIN THE PAST 12 MONTHS, THE FOOD YOU BOUGHT JUST DIDN'T LAST AND YOU DIDN'T HAVE MONEY TO GET MORE: NEVER TRUE
WITHIN THE PAST 12 MONTHS, YOU WORRIED THAT YOUR FOOD WOULD RUN OUT BEFORE YOU GOT THE MONEY TO BUY MORE: NEVER TRUE
WITHIN THE LAST YEAR, HAVE TO BEEN RAPED OR FORCED TO HAVE ANY KIND OF SEXUAL ACTIVITY BY YOUR PARTNER OR EX-PARTNER?: NO
WITHIN THE LAST YEAR, HAVE YOU BEEN KICKED, HIT, SLAPPED, OR OTHERWISE PHYSICALLY HURT BY YOUR PARTNER OR EX-PARTNER?: NO
WITHIN THE LAST YEAR, HAVE YOU BEEN AFRAID OF YOUR PARTNER OR EX-PARTNER?: NO

## 2025-07-10 ASSESSMENT — PATIENT HEALTH QUESTIONNAIRE - PHQ9
2. FEELING DOWN, DEPRESSED, IRRITABLE, OR HOPELESS: NOT AT ALL
SUM OF ALL RESPONSES TO PHQ9 QUESTIONS 1 AND 2: 0
2. FEELING DOWN, DEPRESSED, IRRITABLE, OR HOPELESS: NOT AT ALL
SUM OF ALL RESPONSES TO PHQ9 QUESTIONS 1 AND 2: 0
1. LITTLE INTEREST OR PLEASURE IN DOING THINGS: NOT AT ALL
1. LITTLE INTEREST OR PLEASURE IN DOING THINGS: NOT AT ALL

## 2025-07-10 ASSESSMENT — FIBROSIS 4 INDEX
FIB4 SCORE: .5660377358490566038
FIB4 SCORE: 0.6

## 2025-07-10 NOTE — DISCHARGE INSTRUCTIONS
Discharge Instructions per Elie Mccormick D.O.      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  Okay to wash incision today, apply bacitracin daily for 7 days.  -Continue home Keppra  -Dexamethasone 4 mg every 6 hours - dc home on MDP  -I did inform pt of multidisciplinary clinic follow-up within a month and repeat MRI     DC Instructions:  - Ok to shower, wash surgical site daily with baby shampoo & apply bacitracin once a day   - No Aspirin or non-steroidal anti-inflammatory medications (aleve, motrin, ibuprofen, celebrex)  - No driving   - Over the counter stool softeners daily while on narcotics  - No strenuous activity, weight limit < 10 pounds

## 2025-07-10 NOTE — PROGRESS NOTES
Pt discharged from unit. All belongings with pt. PIV not present. Discharge paperwork reviewed with patient, all questions answered, and paperwork signed. One copy with patient, and one copy kept to be scanned into patient's chart.   M2Bs delivered and reviewed.

## 2025-07-10 NOTE — PROGRESS NOTES
4 Eyes Skin Assessment Completed by SARAHI Tsai and Loan WHIPPLE RN.    Skin assessment is primarily focused on high risk bony prominences. Pay special attention to skin beneath and around medical devices, high risk bony prominences, skin to skin areas and areas where the patient lacks sensation to feel pain and areas where the patient previously had breakdown.     Head (Occipital):  Incision and staples to head    Ears (Under Medical Devices): WDL   Nose (Under Medical Devices): WDL   Mouth:  Poor dentition, teeth black    Neck: WDL   Breast/Chest:  WDL   Shoulder Blades:  WDL   Spine:   WDL   (R) Arm/Elbow/Hand: Elbow discolored and dry   (L) Arm/Elbow/Hand: WDL   Abdomen: WDL   Pannus/Groin:  Excoriation/scratched and Moisture   Sacrum/Coccyx:   WDL   (R) Ischial Tuberosity (Sit Bones):  WDL   (L) Ischial Tuberosity (Sit Bones):  WDL   (R) Leg:  WDL   (L) Leg:  Discolored, scar    (R) Heel:  WDL   (R) Foot/Toe: WDL   (L) Heel: WDL   (L) Foot/Toe:  WDL       DEVICES IN USE:   Respiratory Devices:  NA, patient on room air  Feeding Devices:  N/A   Lines & BP Monitoring Devices:  Peripheral IV, BP cuff, and Pulse ox    Orthopedic Devices:  N/A  Miscellaneous Devices:  Telemetry monitor and SCDs    PROTOCOL INTERVENTIONS:   Low Airloss Bed:  Already in place  ICU Low Airloss Bed:  Already in place    WOUND PHOTOS:   No wounds, incision present to head       WOUND CONSULT:   Wound team already following area(s) of concern, already signed off 7/9

## 2025-07-10 NOTE — DISCHARGE SUMMARY
Discharge Summary    CHIEF COMPLAINT ON ADMISSION  No chief complaint on file.      Reason for Admission  Brain neoplasm malignant (HCC)     Admission Date  7/8/2025    CODE STATUS  Full Code    HPI & HOSPITAL COURSE  Alexander Prasad is a 49 y.o. 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  He presented 7/8/2025 with recurrence of right frontal tumor and was admitted for resection.  On 7/8 Dr Henna Johnson performed a stealth right frontal craniotomy.  There was concern of diaphrosis and EKG abnormality.  While asleep he has bradycardia but awakens with improvement in heart rate.  Per nursing he was drenched pale slightly and had stable glucose level and blood pressure.  Nursing noted that he had a heart rate of 30 while asleep and becomes hypoxic requiring 5 L while asleep     Postsurgery the patient is alert and oriented.  Denies any headache, blurred vision nor any neurologic deficit.  The patient states no palpable masses in the neck armpits breast or testicles.  He states he has not been have any abnormal weight loss.  He has never had a polysomnogram test which he states he is supposed to have outpatient.  Patient was incarcerated at 1 point in time but is never spent any time in any [place] and denies any knowledge of having exposure to tuberculosis.  Patient states he has had episodes of sweating profusely over the last several years and he has not thought much of it.    Patient on day of discharge was smoking in restroom and alerted to no smoking in hospital.  Lectured on harms of smoking and tips for cessation advised.  I have encouraged healthy lifestyle modifications.    Therefore, he is discharged in good and stable condition to home with close outpatient follow-up.    The patient met 2-midnight criteria for an inpatient stay at the time of discharge.    Discharge Date  7/10/25    FOLLOW UP  ITEMS POST DISCHARGE  none    DISCHARGE DIAGNOSES  Principal Problem:    Brain mass (POA: Yes)  Active Problems:    History of methamphetamine abuse (HCC) (POA: Yes)    Seizure (HCC) (POA: Unknown)    Sinus bradycardia (POA: Unknown)    Diaphoresis (POA: Unknown)    Morbid obesity (HCC) (POA: Unknown)    Hypertension (POA: Unknown)  Resolved Problems:    * No resolved hospital problems. *      FOLLOW UP  No future appointments.  Marylin Del Toro M.D.  [address]  [address]  [facility] [id number] [phone]    Schedule an appointment as soon as possible for a visit in 1 month(s)      Henna Johnson M.D.  [address]  [facility] [id number] [phone]    Schedule an appointment as soon as possible for a visit in 1 week(s)        MEDICATIONS ON DISCHARGE     Medication List        START taking these medications        Instructions   amLODIPine 10 MG Tabs  Commonly known as: Norvasc   Take 1 Tablet by mouth every day.  Dose: 10 mg     methylPREDNISolone 4 MG Tbpk  Commonly known as: Medrol Dosepak   Doctor's comments:    Follow schedule on package instructions.            CONTINUE taking these medications        Instructions   levETIRAcetam 500 MG Tabs  Commonly known as: Keppra   Take 1 tablet by mouth twice daily  Dose: 500 mg              Allergies  Allergies[1]    DIET  Orders Placed This Encounter   Procedures    Diet Order Diet: Regular     Standing Status:   Standing     Number of Occurrences:   1     Diet::   Regular [1]       ACTIVITY  As tolerated.  Weight bearing as tolerated    CONSULTATIONS  Critical care.    PROCEDURES  7/8/25 Right Frontal Craniotomy for tumor resection    LABORATORY  Lab Results   Component Value Date    SODIUM 139 07/10/2025    POTASSIUM 4.4 07/10/2025    CHLORIDE 107 07/10/2025    CO2 19 (L) 07/10/2025    GLUCOSE 136 (H) 07/10/2025    BUN 19 07/10/2025    CREATININE 0.97 07/10/2025        Lab Results   Component Value Date    WBC 23.3 (H) 07/10/2025    HEMOGLOBIN 12.9 (L) 07/10/2025     HEMATOCRIT 38.8 (L) 07/10/2025    PLATELETCT 298 07/10/2025      EC-ECHOCARDIOGRAM COMPLETE W/O CONT   Final Result      CT-HEAD W/O   Final Result      1.  Interval right frontal craniotomy with moderate sized irregular postsurgical defect in the right anterior frontal lobe.   2.  Small amount of increased attenuation about the periphery of the resection cavity consistent with a small amount of hemorrhage.   3.  Nondependent pneumocephalus in the right frontal region and postoperative cavity.                  MR-STEALTH BRAIN WITH & W/O   Final Result    Limited study   When compared with the previous MRI dated 4/25/2025 again noted is abnormal right frontal T2 hyperintense mass. There has been interval increase in the size of the enhancing area in the right frontal lesion. There is also mild overall interval increase    in the size of the lesion.            Total time of the discharge process exceeds 31 minutes.         [1] No Active Allergies

## 2025-07-10 NOTE — WOUND TEAM
[facility]  Inpatient Services  Wound and Skin Care Brief Evaluation    Admission Date: 7/8/2025     Last order of IP CONSULT TO WOUND CARE was found on 7/8/2025 from Hospital Encounter on 6/24/2025     HPI, PMH, SH: Reviewed    No chief complaint on file.    Diagnosis: Brain neoplasm malignant (HCC) [C71.9]    Unit where seen by Wound Team: R106/00     Wound consult placed regarding L elbow and L calf. Chart and images reviewed. This clinician in to assess patient. Patient pleasant and agreeable.   L elbow intact and blanching.  L calf with scar tissue.    No pressure injuries or advanced wound care needs identified. Wound consult completed. Wound team signing off, re-consult if patient has further advanced wound care needs.         PREVENTATIVE INTERVENTIONS:    Q shift Kam - performed per nursing policy  Q shift pressure point assessments - performed per nursing policy    Surface/Positioning  ICU Low Airloss - Currently in Place

## 2025-07-10 NOTE — PROGRESS NOTES
Neurosurgery Progress Note    Subjective:  Doing well, feels great, wants to go home. OOB, taking po, voids      Exam:  Face symmetric  No drift  Moves all extremities well antigravity and equally    Cranial incision clean, dry intact.     Recent Labs     07/08/25  1430 07/09/25  0423 07/10/25  0414   WBC 15.0* 23.1* 23.3*   RBC 5.19 4.73 4.43*   HEMOGLOBIN 14.7 13.8* 12.9*   HEMATOCRIT 44.8 40.9* 38.8*   MCV 86.3 86.5 87.6   MCH 28.3 29.2 29.1   MCHC 32.8 33.7 33.2   RDW 40.7 41.1 42.9   PLATELETCT 299 318 298   MPV 8.3* 8.4* 8.7*     Recent Labs     07/08/25  1430 07/09/25  0423 07/10/25  0414   SODIUM 136 137 139   POTASSIUM 4.9 4.2 4.4   CHLORIDE 107 107 107   CO2 18* 17* 19*   GLUCOSE 185* 164* 136*   BUN 17 16 19   CREATININE 1.02 1.06 0.97   CALCIUM 8.8 8.3* 8.6               Intake/Output                         07/09/25 0700 - 07/10/25 0659 07/10/25 0700 - 07/11/25 0659     [id number] 1227-6214 Total 7643-8026 8492-4091 Total                 Intake    P.O.  --  -- --  240  -- 240    P.O. -- -- -- 240 -- 240    I.V.  261.8  -- 261.8  --  -- --    Volume (mL) (NS infusion) 261.8 -- 261.8 -- -- --    Total Intake 261.8 -- 261.8 240 -- 240       Output    Urine  200  600 800  --  -- --    Number of Times Voided 1 x 1 x 2 x -- -- --    Urine Void (mL) 200 600 800 -- -- --    Drains  100  -- 100  --  -- --    Output (mL) ([REMOVED] Closed/Suction Drain 1 Right Scalp Riaz Covarrubias 7 Fr. 07/09/25 0934) 100 -- 100 -- -- --    Stool  --  -- --  --  -- --    Number of Times Stooled -- -- -- 1 x -- 1 x    Total Output 300 600 900 -- -- --       Net I/O     -38.2 -600 -638.2 240 -- 240            Imaging:    CT Exams CT-HEAD W/O at 7/8/2025 12:54 PM  Begin Exam: 12:33 PM  End Exam: 12:49 PM  Impression:   1.  Interval right frontal craniotomy with moderate sized irregular postsurgical defect in the right anterior frontal lobe.  2.  Small amount of increased attenuation about the periphery of the resection cavity  consistent with a small amount of hemorrhage.  3.  Nondependent pneumocephalus in the right frontal region and postoperative cavity.             MRI Exams No results found.       Assessment and Plan:  Patient is hospital day # 3  Postop day #2 status post craniotomy for resection of recurrent glioma    Chemical prophylactic DVT therapy: No  Start date/time: Tomorrow    - Keep incision open to air.  Okay to wash incision today, apply bacitracin twice daily for 7 days.  -Continue home Keppra  -Dexamethasone 4 mg every 6 hours - dc home on MDP  -I did inform pt of multidisciplinary clinic follow-up within a month and repeat MRI    DC Instructions:  - Ok to shower, wash surgical site daily with baby shampoo & apply bacitracin once a day   - No Aspirin or non-steroidal anti-inflammatory medications (aleve, motrin, ibuprofen, celebrex)  - No driving   - Over the counter stool softeners daily while on narcotics  - No strenuous activity, weight limit < 10 pounds      Please call with questions.    SWEETIE Bright.

## 2025-07-10 NOTE — CARE PLAN
The patient is Watcher - Medium risk of patient condition declining or worsening    Shift Goals  Clinical Goals: SBP <140, Q4 neuro checks, hemodynamic stability  Patient Goals: comfort, sleep  Family Goals: CYNDY    Progress made toward(s) clinical / shift goals:    Problem: Knowledge Deficit - Standard  Goal: Patient and family/care givers will demonstrate understanding of plan of care, disease process/condition, diagnostic tests and medications  Outcome: Progressing  Note: Patient demonstrates and verbalizes understanding of POC and education provided throughout shift.     Problem: Fall Risk  Goal: Patient will remain free from falls  Outcome: Progressing  Note: Patient educated on fall protocol and to call before attempting to ambulate. Bed locked in lowest position with bed alarm in place. Hourly rounding in place. Nonslip socks in use. Floor remains clean and clutter free. Patient's belongings and tray table placed within reach. Call light in place.       Problem: Pain - Standard  Goal: Alleviation of pain or a reduction in pain to the patient’s comfort goal  Outcome: Progressing  Note: Used pharmacological and non-pharmacological methods for pain reduction throughout shift. Medicated patient with PRN oxycodone (see MAR/flowsheet).      Problem: Neuro Status  Goal: Neuro status will remain stable or improve  Outcome: Progressing  Note: Q4 neuro checks in place, patient remains A/Ox4 throughout shift with no new neurological changes.      Problem: Infection  Goal: Will remain free from infection  Outcome: Progressing  Note: Monitor surgical site for s/s of infection, applied bacitracin.      Problem: Hemodynamics  Goal: Patient's hemodynamics, fluid balance and neurologic status will be stable or improve  Outcome: Progressing  Note: Continuous BP/HR monitoring in place with cardiac monitoring. SBP goal <140, medicated patient with PRN labetalol (see MAR/flowsheets).

## 2025-07-10 NOTE — PROGRESS NOTES
Pt transferred from R106 to T606. Report to [facility]. All questions answered. Pt left with all belongings and medications

## 2025-07-11 ENCOUNTER — PATIENT OUTREACH (OUTPATIENT)
Dept: ONCOLOGY | Facility: MEDICAL CENTER | Age: 50
End: 2025-07-11
Payer: MEDICAID

## 2025-07-11 NOTE — PROGRESS NOTES
Had planned to meet with patient yesterday while in hospital.  Pt was discharged sooner than navigator had anticipated so was unable to touch base with him.  Call placed to patient to follow up on after surgery and discharge.  No answer and unable to leave voice message as no voice mailbox set up. Will attempt again next week.

## 2025-07-15 ENCOUNTER — PATIENT OUTREACH (OUTPATIENT)
Dept: ONCOLOGY | Facility: MEDICAL CENTER | Age: 50
End: 2025-07-15
Payer: MEDICAID

## 2025-07-15 NOTE — PROGRESS NOTES
2nd attempt to reach patient for nurse navigation, no answer and voice mailbox has not been set up yet.  Call placed to Mary Ann, emergency contact, no answer and voice mailbox is full.

## 2025-07-28 ENCOUNTER — PATIENT OUTREACH (OUTPATIENT)
Dept: ONCOLOGY | Facility: MEDICAL CENTER | Age: 50
End: 2025-07-28
Payer: MEDICAID

## 2025-07-28 NOTE — PROGRESS NOTES
Attempt to contact patient for cancer navigation follow up, no answer and voicemailbox has not been set up yet .  Sent [employer] message in hopes to connect with patient.

## 2025-08-01 ENCOUNTER — PATIENT OUTREACH (OUTPATIENT)
Dept: ONCOLOGY | Facility: MEDICAL CENTER | Age: 50
End: 2025-08-01
Payer: MEDICAID

## 2025-08-06 ENCOUNTER — APPOINTMENT (OUTPATIENT)
Dept: MEDICAL GROUP | Facility: MEDICAL CENTER | Age: 50
End: 2025-08-06
Payer: MEDICAID

## 2025-08-08 ENCOUNTER — TELEPHONE (OUTPATIENT)
Dept: RADIATION ONCOLOGY | Facility: MEDICAL CENTER | Age: 50
End: 2025-08-08
Payer: MEDICAID

## 2025-08-11 ENCOUNTER — PATIENT OUTREACH (OUTPATIENT)
Dept: ONCOLOGY | Facility: MEDICAL CENTER | Age: 50
End: 2025-08-11
Payer: MEDICAID

## 2025-08-18 ENCOUNTER — PATIENT OUTREACH (OUTPATIENT)
Dept: ONCOLOGY | Facility: MEDICAL CENTER | Age: 50
End: 2025-08-18
Payer: MEDICAID

## 2025-08-20 ENCOUNTER — PATIENT OUTREACH (OUTPATIENT)
Dept: ONCOLOGY | Facility: MEDICAL CENTER | Age: 50
End: 2025-08-20
Payer: MEDICAID

## (undated) DEVICE — GLOVE BIOGEL PI INDICATOR SZ 6.5 SURGICAL PF LF - (50/BX 4BX/CA)

## (undated) DEVICE — GLOVE BIOGEL SZ 7 SURGICAL PF LTX - (50PR/BX 4BX/CA)

## (undated) DEVICE — DRAPE 36X28IN RAD CARM BND BG - (25/CA)

## (undated) DEVICE — DRAPE MICROSCOPE ARMATEC 120IN X 46IN (10EA/CA)

## (undated) DEVICE — GOWN WARMING STANDARD FLEX - (30/CA)

## (undated) DEVICE — COVER LIGHT HANDLE ALC PLUS DISP (18EA/BX)

## (undated) DEVICE — BAG SPONGE COUNT 10.25 X 32 - BLUE (250/CA)

## (undated) DEVICE — TUBE CONNECT SUCTION CLEAR 120 X 1/4" (50EA/CA)"

## (undated) DEVICE — GLOVE BIOGEL PI INDICATOR SZ 6.0 SURGICAL PF LF -(200PR/CA)

## (undated) DEVICE — CORETEMP DRAPE FORM-FITTED EASY DROPANDGO DRAPE FOR USE ON THE CORETEMP FLUID MANAGEMENT 56IN X 56IN

## (undated) DEVICE — HEMOSTAT SURG ABSORBABLE - 4 X 8 IN SURGICEL (24EA/CA)

## (undated) DEVICE — BONE WAX (12PK/BX)

## (undated) DEVICE — SPHERE NAVIGATION STEALTH (5EA/TY 12TY/PK)

## (undated) DEVICE — SUTURE 4-0 MONOCRYL PLUS PS-2 - 27 INCH (36/BX)

## (undated) DEVICE — TRAY SURESTEP FOLEY TEMP SENSING 16FR SNAP SECURE(10EA/CA) ORDER #18764 FOR TEMP FOLEY ONLY

## (undated) DEVICE — RESERVOIR SUCTION 100 CC - SILICONE (20EA/CA)

## (undated) DEVICE — ELECTRODE DUAL RETURN W/ CORD - (50/PK)

## (undated) DEVICE — BOVIE BLADE COATED &INSULATED - 25/PK

## (undated) DEVICE — PATTIES SURG NEURO X-RAY 1X1 (10EA/PK 20PK/CA)

## (undated) DEVICE — SUTURE 3-0 ETHILON FS-1 - (36/BX) 30 INCH

## (undated) DEVICE — FORCEP BIPOLAR ILLUMINATED DISPOSABLE (5EA/BX)

## (undated) DEVICE — PACK NEURO - (3EA/CA)

## (undated) DEVICE — GLOVE BIOGEL PI ORTHO SZ 7 PF LF (40PR/BX)

## (undated) DEVICE — SUTURE 2-0 VICRYL CT-2 8 X 18 INCH (12EA/BX)

## (undated) DEVICE — SURGIFOAM (SIZE 100) - (6EA/CA)

## (undated) DEVICE — CASSETTE IRRIGATION SONOPET IQ SUCTION (4EA/PK)

## (undated) DEVICE — GOWN SURGEONS LARGE - (32/CA)

## (undated) DEVICE — PATTIES SURG NEURO X-RAY 1/2X1/2 (10EA/PK 20PK/CS)

## (undated) DEVICE — DRESSING XEROFORM 1X8 - (50/BX 4BX/CA)

## (undated) DEVICE — SET LEADWIRE 5 LEAD BEDSIDE DISPOSABLE ECG (1SET OF 5/EA)

## (undated) DEVICE — FIBRILLAR SURGICEL 4X4 - 10/CA

## (undated) DEVICE — SUCTION INSTRUMENT YANKAUER BULBOUS TIP W/O VENT (50EA/CA)

## (undated) DEVICE — DRESSING TRANSPARENT FILM TEGADERM 2.375 X 2.75" (100EA/BX)"

## (undated) DEVICE — SPONGE TELFA SURGICAL PATTIES 1/2IN X 3IN (10EA/PK 20PK/BX)

## (undated) DEVICE — SODIUM CHL. INJ. 0.9% 500ML (24EA/CA 50CA/PF)

## (undated) DEVICE — MAT PATIENT POSITIONING PREVALON (10EA/CA)

## (undated) DEVICE — KIT SURGIFLO W/OUT THROMBIN - (6EA/BX)

## (undated) DEVICE — SET CATHETER CENTRAL VENOUS 5X15 ORDER BY THE EACH

## (undated) DEVICE — COVER PROBE STERILE CONE (12EA/CA)

## (undated) DEVICE — SUTURE GENERAL

## (undated) DEVICE — CANISTER SUCTION 3000ML MECHANICAL FILTER AUTO SHUTOFF MEDI-VAC NONSTERILE LF DISP (40EA/CA)

## (undated) DEVICE — GLOVE SZ 7.5 BIOGEL PI MICRO - PF LF (50PR/BX)

## (undated) DEVICE — GLOVE BIOGEL PI INDICATOR SZ 7.0 SURGICAL PF LF - (50/BX 4BX/CA)

## (undated) DEVICE — BATTERY VARISPEED

## (undated) DEVICE — DRAPE LARGE 3 QUARTER - (20/CA)

## (undated) DEVICE — LACTATED RINGERS INJ 1000 ML - (14EA/CA 60CA/PF)

## (undated) DEVICE — TIP ASPIRATOR SONOPET IQ STANDARD 12CM (4EA/PK)

## (undated) DEVICE — WATER IRRIGATION STERILE 1000ML (12EA/CA)

## (undated) DEVICE — CHLORAPREP 26 ML APPLICATOR - ORANGE TINT(25/CA)

## (undated) DEVICE — TOOL MR8 9CM ACORN 7.5MM DIAMETER (1/EA)

## (undated) DEVICE — BLADE SURGICAL CLIPPER - (50EA/CA)

## (undated) DEVICE — CORDS BIPOLAR COAGULATION - 12FT STERILE DISP. (10EA/BX)

## (undated) DEVICE — GLOVE BIOGEL INDICATOR SZ 7SURGICAL PF LTX - (50/BX 4BX/CA)

## (undated) DEVICE — LACTATED RINGERS INJ. 500 ML - (24EA/CA)

## (undated) DEVICE — TOOL MR8 2.3CM F2/7CM TAPER (1/EA)

## (undated) DEVICE — GLOVE SZ 6 BIOGEL PI MICRO - PF LF (50PR/BX 4BX/CA)

## (undated) DEVICE — PIN HEAD MAYFIELD DISP. (3EA/PK 12PK/BX)

## (undated) DEVICE — TOWELS CLOTH SURGICAL - (4/PK 20PK/CA)

## (undated) DEVICE — STAY ELASTIC BLUNT RETRACTOR 12MM (8EA/PK)

## (undated) DEVICE — DRAPE T CRANIOTOMY W/POUCH - (9/CA)

## (undated) DEVICE — CATHETER IV 14 GA X 2 ---SURG.& SDS ONLY---(200EA/CA)

## (undated) DEVICE — SET EXTENSION WITH 2 PORTS (48EA/CA) ***PART #2C8610 IS A SUBSTITUTE*****

## (undated) DEVICE — SPONGE GAUZE STER 4X4 8-PL - (2/PK 50PK/BX 12BX/CS)

## (undated) DEVICE — SUTURE 4-0 NUROLON CR/8 TF - (12/BX) ETHICON

## (undated) DEVICE — DRAIN J-VAC 7MM FLAT - (10EA/CA)

## (undated) DEVICE — SODIUM CHL IRRIGATION 0.9% 1000ML (12EA/CA)

## (undated) DEVICE — TUBING CLEARLINK DUO-VENT - C-FLO (48EA/CA)

## (undated) DEVICE — SLEEVE VASO DVT COMPRESSION CALF MED - (10PR/CA)

## (undated) DEVICE — SCALP CLIP RANEY 20-1037 (10EA/PK 20PK/CA)